# Patient Record
Sex: MALE | Race: WHITE | NOT HISPANIC OR LATINO | Employment: FULL TIME | URBAN - METROPOLITAN AREA
[De-identification: names, ages, dates, MRNs, and addresses within clinical notes are randomized per-mention and may not be internally consistent; named-entity substitution may affect disease eponyms.]

---

## 2023-08-04 LAB
ALANINE AMINOTRANSFERASE (SGPT) (U/L) IN SER/PLAS: 21 U/L (ref 10–52)
ALBUMIN (G/DL) IN SER/PLAS: 4.7 G/DL (ref 3.4–5)
ALKALINE PHOSPHATASE (U/L) IN SER/PLAS: 70 U/L (ref 33–120)
ANION GAP IN SER/PLAS: 11 MMOL/L (ref 10–20)
APPEARANCE, URINE: CLEAR
ASPARTATE AMINOTRANSFERASE (SGOT) (U/L) IN SER/PLAS: 17 U/L (ref 9–39)
BASOPHILS (10*3/UL) IN BLOOD BY AUTOMATED COUNT: 0.07 X10E9/L (ref 0–0.1)
BASOPHILS/100 LEUKOCYTES IN BLOOD BY AUTOMATED COUNT: 1.1 % (ref 0–2)
BILIRUBIN TOTAL (MG/DL) IN SER/PLAS: 0.7 MG/DL (ref 0–1.2)
BILIRUBIN, URINE: NEGATIVE
BLOOD, URINE: NEGATIVE
CALCIUM (MG/DL) IN SER/PLAS: 9.4 MG/DL (ref 8.6–10.6)
CARBON DIOXIDE, TOTAL (MMOL/L) IN SER/PLAS: 29 MMOL/L (ref 21–32)
CHLORIDE (MMOL/L) IN SER/PLAS: 109 MMOL/L (ref 98–107)
CHOLESTEROL (MG/DL) IN SER/PLAS: 134 MG/DL (ref 0–199)
CHOLESTEROL IN HDL (MG/DL) IN SER/PLAS: 50.4 MG/DL
CHOLESTEROL/HDL RATIO: 2.7
COLOR, URINE: NORMAL
CREATINE KINASE (U/L) IN SER/PLAS: 82 U/L (ref 0–325)
CREATININE (MG/DL) IN SER/PLAS: 0.68 MG/DL (ref 0.5–1.3)
EOSINOPHILS (10*3/UL) IN BLOOD BY AUTOMATED COUNT: 0.09 X10E9/L (ref 0–0.7)
EOSINOPHILS/100 LEUKOCYTES IN BLOOD BY AUTOMATED COUNT: 1.4 % (ref 0–6)
ERYTHROCYTE DISTRIBUTION WIDTH (RATIO) BY AUTOMATED COUNT: 12.3 % (ref 11.5–14.5)
ERYTHROCYTE MEAN CORPUSCULAR HEMOGLOBIN CONCENTRATION (G/DL) BY AUTOMATED: 32.9 G/DL (ref 32–36)
ERYTHROCYTE MEAN CORPUSCULAR VOLUME (FL) BY AUTOMATED COUNT: 88 FL (ref 80–100)
ERYTHROCYTES (10*6/UL) IN BLOOD BY AUTOMATED COUNT: 5.26 X10E12/L (ref 4.5–5.9)
GFR MALE: >90 ML/MIN/1.73M2
GLUCOSE (MG/DL) IN SER/PLAS: 96 MG/DL (ref 74–99)
GLUCOSE, URINE: NEGATIVE MG/DL
HEMATOCRIT (%) IN BLOOD BY AUTOMATED COUNT: 46.2 % (ref 41–52)
HEMOGLOBIN (G/DL) IN BLOOD: 15.2 G/DL (ref 13.5–17.5)
IMMATURE GRANULOCYTES/100 LEUKOCYTES IN BLOOD BY AUTOMATED COUNT: 0.6 % (ref 0–0.9)
KETONES, URINE: NEGATIVE MG/DL
LDL: 71 MG/DL (ref 0–99)
LEUKOCYTE ESTERASE, URINE: NEGATIVE
LEUKOCYTES (10*3/UL) IN BLOOD BY AUTOMATED COUNT: 6.2 X10E9/L (ref 4.4–11.3)
LYMPHOCYTES (10*3/UL) IN BLOOD BY AUTOMATED COUNT: 2.37 X10E9/L (ref 1.2–4.8)
LYMPHOCYTES/100 LEUKOCYTES IN BLOOD BY AUTOMATED COUNT: 38.1 % (ref 13–44)
MONOCYTES (10*3/UL) IN BLOOD BY AUTOMATED COUNT: 0.45 X10E9/L (ref 0.1–1)
MONOCYTES/100 LEUKOCYTES IN BLOOD BY AUTOMATED COUNT: 7.2 % (ref 2–10)
NEUTROPHILS (10*3/UL) IN BLOOD BY AUTOMATED COUNT: 3.2 X10E9/L (ref 1.2–7.7)
NEUTROPHILS/100 LEUKOCYTES IN BLOOD BY AUTOMATED COUNT: 51.6 % (ref 40–80)
NITRITE, URINE: NEGATIVE
NRBC (PER 100 WBCS) BY AUTOMATED COUNT: 0 /100 WBC (ref 0–0)
PH, URINE: 7 (ref 5–8)
PLATELETS (10*3/UL) IN BLOOD AUTOMATED COUNT: 312 X10E9/L (ref 150–450)
POTASSIUM (MMOL/L) IN SER/PLAS: 4.9 MMOL/L (ref 3.5–5.3)
PROTEIN TOTAL: 7.3 G/DL (ref 6.4–8.2)
PROTEIN, URINE: NEGATIVE MG/DL
SODIUM (MMOL/L) IN SER/PLAS: 144 MMOL/L (ref 136–145)
SPECIFIC GRAVITY, URINE: 1.01 (ref 1–1.03)
THYROTROPIN (MIU/L) IN SER/PLAS BY DETECTION LIMIT <= 0.05 MIU/L: 1.95 MIU/L (ref 0.44–3.98)
TRIGLYCERIDE (MG/DL) IN SER/PLAS: 61 MG/DL (ref 0–149)
UREA NITROGEN (MG/DL) IN SER/PLAS: 16 MG/DL (ref 6–23)
UROBILINOGEN, URINE: <2 MG/DL (ref 0–1.9)
VLDL: 12 MG/DL (ref 0–40)

## 2023-09-30 PROBLEM — C44.311 BASAL CELL CARCINOMA OF SKIN OF NOSE: Status: ACTIVE | Noted: 2022-06-09

## 2023-09-30 PROBLEM — E78.5 HYPERLIPIDEMIA: Status: ACTIVE | Noted: 2023-09-30

## 2023-09-30 PROBLEM — R50.9 FEVER: Status: ACTIVE | Noted: 2023-09-30

## 2023-09-30 PROBLEM — R63.5 WEIGHT GAIN: Status: ACTIVE | Noted: 2023-09-30

## 2023-09-30 PROBLEM — D22.62 MELANOCYTIC NEVI OF LEFT UPPER LIMB, INCLUDING SHOULDER: Status: ACTIVE | Noted: 2022-06-09

## 2023-09-30 PROBLEM — D22.5 MELANOCYTIC NEVI OF TRUNK: Status: ACTIVE | Noted: 2022-06-09

## 2023-09-30 PROBLEM — M79.10 MYALGIA: Status: ACTIVE | Noted: 2023-09-30

## 2023-09-30 PROBLEM — L91.0 KELOID: Status: ACTIVE | Noted: 2022-06-09

## 2023-09-30 PROBLEM — J30.9 ALLERGIC RHINITIS: Status: ACTIVE | Noted: 2023-09-30

## 2023-09-30 PROBLEM — D23.39 OTHER BENIGN NEOPLASM OF SKIN OF OTHER PARTS OF FACE: Status: ACTIVE | Noted: 2022-06-09

## 2023-09-30 PROBLEM — J04.0 LARYNGITIS: Status: ACTIVE | Noted: 2023-09-30

## 2023-09-30 PROBLEM — G47.30 SLEEP APNEA: Status: ACTIVE | Noted: 2023-09-30

## 2023-09-30 PROBLEM — D23.71 OTHER BENIGN NEOPLASM OF SKIN OF RIGHT LOWER LIMB, INCLUDING HIP: Status: ACTIVE | Noted: 2022-06-09

## 2023-09-30 PROBLEM — R05.9 COUGH: Status: ACTIVE | Noted: 2023-09-30

## 2023-09-30 PROBLEM — E66.3 OVERWEIGHT WITH BODY MASS INDEX (BMI) OF 29 TO 29.9 IN ADULT: Status: ACTIVE | Noted: 2023-09-30

## 2023-09-30 PROBLEM — R09.81 SINUS CONGESTION: Status: ACTIVE | Noted: 2023-09-30

## 2023-09-30 PROBLEM — H53.8 BLURRED VISION: Status: ACTIVE | Noted: 2023-09-30

## 2023-09-30 PROBLEM — L98.9 BENIGN SKIN LESION OF MULTIPLE SITES: Status: ACTIVE | Noted: 2023-09-30

## 2023-09-30 PROBLEM — F32.A DEPRESSION, CONTROLLED: Status: ACTIVE | Noted: 2023-09-30

## 2023-09-30 RX ORDER — MINERAL OIL
1 ENEMA (ML) RECTAL DAILY
COMMUNITY
Start: 2022-04-05

## 2023-09-30 RX ORDER — ALBUTEROL SULFATE 90 UG/1
AEROSOL, METERED RESPIRATORY (INHALATION)
COMMUNITY
Start: 2022-10-24 | End: 2023-10-11 | Stop reason: ALTCHOICE

## 2023-09-30 RX ORDER — CEPHALEXIN 500 MG/1
1 TABLET ORAL 2 TIMES DAILY
COMMUNITY
End: 2023-10-03 | Stop reason: ALTCHOICE

## 2023-09-30 RX ORDER — FLUTICASONE PROPIONATE 50 MCG
2 SPRAY, SUSPENSION (ML) NASAL DAILY
COMMUNITY
Start: 2018-12-17 | End: 2024-04-29 | Stop reason: SDUPTHER

## 2023-09-30 RX ORDER — SULFAMETHOXAZOLE AND TRIMETHOPRIM 800; 160 MG/1; MG/1
1 TABLET ORAL EVERY 12 HOURS
COMMUNITY
Start: 2022-11-17 | End: 2023-10-03 | Stop reason: ALTCHOICE

## 2023-09-30 RX ORDER — ROSUVASTATIN CALCIUM 5 MG/1
1 TABLET, COATED ORAL NIGHTLY
COMMUNITY
Start: 2022-04-14 | End: 2024-02-29 | Stop reason: SDUPTHER

## 2023-10-03 ENCOUNTER — OFFICE VISIT (OUTPATIENT)
Dept: PRIMARY CARE | Facility: CLINIC | Age: 48
End: 2023-10-03
Payer: OTHER GOVERNMENT

## 2023-10-03 VITALS — BODY MASS INDEX: 30.11 KG/M2 | DIASTOLIC BLOOD PRESSURE: 90 MMHG | WEIGHT: 198 LBS | SYSTOLIC BLOOD PRESSURE: 120 MMHG

## 2023-10-03 DIAGNOSIS — M25.441 FINGER JOINT SWELLING, RIGHT: Primary | ICD-10-CM

## 2023-10-03 PROCEDURE — 99213 OFFICE O/P EST LOW 20 MIN: CPT | Performed by: INTERNAL MEDICINE

## 2023-10-03 ASSESSMENT — PATIENT HEALTH QUESTIONNAIRE - PHQ9
2. FEELING DOWN, DEPRESSED OR HOPELESS: NOT AT ALL
SUM OF ALL RESPONSES TO PHQ9 QUESTIONS 1 AND 2: 0
1. LITTLE INTEREST OR PLEASURE IN DOING THINGS: NOT AT ALL

## 2023-10-03 NOTE — PROGRESS NOTES
Subjective   Patient ID: Glen Reynolds is a 48 y.o. male who presents for Hand Injury (Left ring finger cut.  Possible infection).    HPI  Patient  is here for visit   Swelling on the middle knuckle of the left middle  we had treated with antbx for five days  Still swelling and slightly painful   Review of Systems    Previous history  Past Medical History:   Diagnosis Date    Abnormal weight gain 07/29/2022    Weight gain    Acute laryngitis 11/17/2022    Laryngitis    Cough, unspecified 11/17/2022    Cough    Disorder of the skin and subcutaneous tissue, unspecified 02/27/2021    Benign skin lesion of multiple sites    Fever, unspecified 07/29/2022    Fever    Hyperlipidemia, unspecified 12/29/2022    Hyperlipidemia    Myalgia, unspecified site 07/29/2022    Myalgia    Nasal congestion 07/28/2022    Sinus congestion    Other seasonal allergic rhinitis     Seasonal allergies    Personal history of other diseases of the respiratory system 10/31/2019    History of acute bronchitis    Personal history of other mental and behavioral disorders     History of depression    Sneezing 04/05/2022    Sneezing     Past Surgical History:   Procedure Laterality Date    OTHER SURGICAL HISTORY  10/31/2019    No history of surgery     Social History     Tobacco Use    Smoking status: Never    Smokeless tobacco: Never     Family History   Problem Relation Name Age of Onset    Diabetes Mother      Depression Mother          major    COPD Father      Diabetes Father      Other (spinal stenosis) Father       Allergies   Allergen Reactions    Latex Unknown     Current Outpatient Medications   Medication Instructions    albuterol 90 mcg/actuation inhaler inhalation    cephalexin (Keftab) 500 mg tablet 1 tablet, oral, 2 times daily    fexofenadine (Allegra) 180 mg tablet 1 tablet, oral, Daily    fluticasone (Flonase) 50 mcg/actuation nasal spray 2 sprays, Each Nostril, Daily    rosuvastatin (Crestor) 5 mg tablet 1 tablet, oral, Nightly     sulfamethoxazole-trimethoprim (Bactrim DS) 800-160 mg tablet 1 tablet, oral, Every 12 hours       Objective       Physical Exam  Vital Signs: as recorded above  General: Well groomed, well nourished   Orientation:  Alert , oriented to time, place , and person   Mood and Affect:  Cooperative , no apparent distress normal affect  Skin: Good color, good turgor  color erythematous dorsum PIP of 3rd digit , swelling ,mild tenderness  Eyes: Extra ocular muscle movements intact, anicteric sclerae  Neck: Supple, full range of movement  Extremities: full range of movement  bilateral UE and bilateral LE,  no lower extremity edema  Neurological: Alert, oriented, cranial nerves II-XII intact except for visual acuity  Sensation:  Intact   Gait: normal steady      Assessment/Plan   Glen Reynolds is a 48 y.o. male who presents for the concerns below:    Problem List Items Addressed This Visit    None    Swelling s/p treatment of infection ,this has resolved but the swelling has not, had been taking 200mg bid so will increase to 400 mg tid withmeals  for three days  if still not back to  Normal will do an xray          Discussed with:   Return in :    Portions of this note were generated using digital voice recognition software, and may contain grammatical errors       Miya Callaway MD  10/03/23  3:44 PM

## 2023-10-10 ENCOUNTER — OFFICE VISIT (OUTPATIENT)
Dept: PRIMARY CARE | Facility: CLINIC | Age: 48
End: 2023-10-10
Payer: OTHER GOVERNMENT

## 2023-10-10 ENCOUNTER — ANCILLARY PROCEDURE (OUTPATIENT)
Dept: RADIOLOGY | Facility: CLINIC | Age: 48
End: 2023-10-10
Payer: OTHER GOVERNMENT

## 2023-10-10 VITALS — WEIGHT: 200 LBS | BODY MASS INDEX: 30.41 KG/M2 | SYSTOLIC BLOOD PRESSURE: 120 MMHG | DIASTOLIC BLOOD PRESSURE: 78 MMHG

## 2023-10-10 DIAGNOSIS — M25.442 FINGER JOINT SWELLING, LEFT: ICD-10-CM

## 2023-10-10 DIAGNOSIS — M25.442 FINGER JOINT SWELLING, LEFT: Primary | ICD-10-CM

## 2023-10-10 PROCEDURE — 73140 X-RAY EXAM OF FINGER(S): CPT | Mod: LT

## 2023-10-10 PROCEDURE — 99213 OFFICE O/P EST LOW 20 MIN: CPT | Performed by: INTERNAL MEDICINE

## 2023-10-10 PROCEDURE — 73140 X-RAY EXAM OF FINGER(S): CPT | Mod: LEFT SIDE | Performed by: RADIOLOGY

## 2023-10-10 NOTE — PROGRESS NOTES
Subjective   Patient ID: Glen Reynolds is a 48 y.o. male who presents for FUV. and Hand Pain.    HPI  Patient in for a visit    Review of Systems  General: Denies fever, chills, night sweats,  Eyes: Negative for recent visual changes  Ears, Nose, Throat :  Negative for hearing changes, sinus discomfort  Dermatologic: Negative for new skin conditions, rash  Respiratory: Negative for wheezing, shortness of breath, cough  Cardiovascular: Negative for chest pain, palpitations, or leg swelling  Gastrointestinal: Negative for nausea/vomiting, abdominal pain, changes in bowel habits  Genitourinary NEGATIVE FOR URINARY INCONTINENCE urgency , frequency, discomfort   Musculoskeletal: see hpi  Neurological: Negative for headaches, dizziness    Previous history  Past Medical History:   Diagnosis Date    Abnormal weight gain 07/29/2022    Weight gain    Acute laryngitis 11/17/2022    Laryngitis    Cough, unspecified 11/17/2022    Cough    Disorder of the skin and subcutaneous tissue, unspecified 02/27/2021    Benign skin lesion of multiple sites    Fever, unspecified 07/29/2022    Fever    Hyperlipidemia, unspecified 12/29/2022    Hyperlipidemia    Myalgia, unspecified site 07/29/2022    Myalgia    Nasal congestion 07/28/2022    Sinus congestion    Other seasonal allergic rhinitis     Seasonal allergies    Personal history of other diseases of the respiratory system 10/31/2019    History of acute bronchitis    Personal history of other mental and behavioral disorders     History of depression    Sneezing 04/05/2022    Sneezing     Past Surgical History:   Procedure Laterality Date    OTHER SURGICAL HISTORY  10/31/2019    No history of surgery     Social History     Tobacco Use    Smoking status: Never    Smokeless tobacco: Never     Family History   Problem Relation Name Age of Onset    Diabetes Mother      Depression Mother          major    COPD Father      Diabetes Father      Other (spinal stenosis) Father       Allergies    Allergen Reactions    Latex Unknown     Current Outpatient Medications   Medication Instructions    albuterol 90 mcg/actuation inhaler inhalation    fexofenadine (Allegra) 180 mg tablet 1 tablet, oral, Daily    fluticasone (Flonase) 50 mcg/actuation nasal spray 2 sprays, Each Nostril, Daily    rosuvastatin (Crestor) 5 mg tablet 1 tablet, oral, Nightly       Objective       Physical Exam  Vital Signs: as recorded above  General: Well groomed, well nourished   Orientation:  Alert , oriented to time, place , and person   Mood and Affect:  Cooperative , no apparent distress normal affect  Skin: Good color, good turgor  Eyes: Extra ocular muscle movements intact, anicteric sclerae  Neck: Supple, full range of movement  Chest: Normal breath sounds, normal chest wall exam, symmetric, good air entry, clear to auscultation  Heart: Regular rate and rhythm, without murmur, gallop, or rubs  BACK:  no CTLS spine tenderness, no flank tenderness  Extremities: full range of movement  bilateral UE and bilateral LE,  no lower extremity edema  Neurological: Alert, oriented, cranial nerves II-XII intact except for visual acuity  Sensation:  Intact   Gait: normal steady      Assessment/Plan   Glen Reynolds is a 48 y.o. male who presents for the concerns below:    Problem List Items Addressed This Visit    None    Hand JOINT PAIN PLAN:  rest, ice initially then may alternate heat and ice compress, gentle stretching, NSAIDs with meals but if with gastrointestinal upset hold nsaids .  If still not improved will prescribe stronger anti-inflammatories or prescribe a Medrol Dosepak, if no improvement  will do an x-ray and consult with orthopedic or physical therapy    falls precautions , if extremity gives way will need immediate reassessment           Discussed with:   Return in :    Portions of this note were generated using digital voice recognition software, and may contain grammatical errors       Miya Callaway,  MD  10/10/23  2:26 PM

## 2023-10-11 PROBLEM — M79.10 MYALGIA: Status: RESOLVED | Noted: 2023-09-30 | Resolved: 2023-10-11

## 2023-10-11 PROBLEM — R09.81 SINUS CONGESTION: Status: RESOLVED | Noted: 2023-09-30 | Resolved: 2023-10-11

## 2023-10-11 PROBLEM — R50.9 FEVER: Status: RESOLVED | Noted: 2023-09-30 | Resolved: 2023-10-11

## 2023-10-11 PROBLEM — R05.9 COUGH: Status: RESOLVED | Noted: 2023-09-30 | Resolved: 2023-10-11

## 2023-10-11 PROBLEM — J04.0 LARYNGITIS: Status: RESOLVED | Noted: 2023-09-30 | Resolved: 2023-10-11

## 2023-10-13 ENCOUNTER — APPOINTMENT (OUTPATIENT)
Dept: PRIMARY CARE | Facility: CLINIC | Age: 48
End: 2023-10-13
Payer: OTHER GOVERNMENT

## 2023-10-27 ENCOUNTER — PATIENT MESSAGE (OUTPATIENT)
Dept: PRIMARY CARE | Facility: CLINIC | Age: 48
End: 2023-10-27
Payer: OTHER GOVERNMENT

## 2023-10-27 DIAGNOSIS — L98.9 FINGER LESION: Primary | ICD-10-CM

## 2023-10-30 ENCOUNTER — APPOINTMENT (OUTPATIENT)
Dept: ORTHOPEDIC SURGERY | Facility: HOSPITAL | Age: 48
End: 2023-10-30
Payer: OTHER GOVERNMENT

## 2023-10-31 ENCOUNTER — OFFICE VISIT (OUTPATIENT)
Dept: ORTHOPEDIC SURGERY | Facility: CLINIC | Age: 48
End: 2023-10-31
Payer: OTHER GOVERNMENT

## 2023-10-31 DIAGNOSIS — L98.9 FINGER LESION: ICD-10-CM

## 2023-10-31 PROCEDURE — 1036F TOBACCO NON-USER: CPT | Performed by: ORTHOPAEDIC SURGERY

## 2023-10-31 PROCEDURE — 99203 OFFICE O/P NEW LOW 30 MIN: CPT | Performed by: ORTHOPAEDIC SURGERY

## 2023-10-31 ASSESSMENT — PAIN - FUNCTIONAL ASSESSMENT: PAIN_FUNCTIONAL_ASSESSMENT: NO/DENIES PAIN

## 2023-11-01 ENCOUNTER — APPOINTMENT (OUTPATIENT)
Dept: ORTHOPEDIC SURGERY | Facility: HOSPITAL | Age: 48
End: 2023-11-01
Payer: OTHER GOVERNMENT

## 2023-11-01 NOTE — PROGRESS NOTES
History of Present Illness:  Chief Complaint   Patient presents with    Left Hand - Follow-up     Left middle finger laceration- 6 weeks laceration caused by a porcelain cookie jar that was broken       Patient presents today for evaluation of left middle finger laceration that occurred 6 weeks ago.  Referred by Dr. Callaway.  He was pulling on a plug behind a porcelain cookie jar with a broken handle.  He hit the dorsal aspect of his middle finger on the broken handle which caused a laceration about the dorsal aspect of the PIP joint region.  He was initially seen in urgent care where wound was irrigated and reapproximated.  He did have a 1 week course of prophylactic antibiotics.  He has been able to fully move his finger, but notes some soreness with full motion, particularly flexion.  No numbness or tingling.  Also with continued soreness to direct touch.    Past Medical History:   Diagnosis Date    Abnormal weight gain 07/29/2022    Weight gain    Acute laryngitis 11/17/2022    Laryngitis    Cough, unspecified 11/17/2022    Cough    Disorder of the skin and subcutaneous tissue, unspecified 02/27/2021    Benign skin lesion of multiple sites    Fever, unspecified 07/29/2022    Fever    Hyperlipidemia, unspecified 12/29/2022    Hyperlipidemia    Myalgia, unspecified site 07/29/2022    Myalgia    Nasal congestion 07/28/2022    Sinus congestion    Other seasonal allergic rhinitis     Seasonal allergies    Personal history of other diseases of the respiratory system 10/31/2019    History of acute bronchitis    Personal history of other mental and behavioral disorders     History of depression    Sneezing 04/05/2022    Sneezing       Medication Documentation Review Audit       Reviewed by Bebeto Greenberg MD (Physician) on 11/01/23 at 1817      Medication Order Taking? Sig Documenting Provider Last Dose Status   fexofenadine (Allegra) 180 mg tablet [id number]  Take 1 tablet (180 mg) by mouth once daily.  Historical Provider, MD  Active   fluticasone (Flonase) 50 mcg/actuation nasal spray [id number]  Administer 2 sprays into each nostril once daily. Historical Provider, MD  Active   rosuvastatin (Crestor) 5 mg tablet [id number]  Take 1 tablet (5 mg) by mouth once daily at bedtime. Historical Provider, MD  Active                    Allergies   Allergen Reactions    Latex Unknown       Social History     Socioeconomic History    Marital status:      Spouse name: Not on file    Number of children: Not on file    Years of education: Not on file    Highest education level: Not on file   Occupational History    Not on file   Tobacco Use    Smoking status: Never    Smokeless tobacco: Never   Substance and Sexual Activity    Alcohol use: Not on file    Drug use: Not on file    Sexual activity: Not on file   Other Topics Concern    Not on file   Social History Narrative    Not on file     Social Determinants of Health     Financial Resource Strain: Not on file   Food Insecurity: Not on file   Transportation Needs: Not on file   Physical Activity: Not on file   Stress: Not on file   Social Connections: Not on file   Intimate Partner Violence: Not on file   Housing Stability: Not on file       Past Surgical History:   Procedure Laterality Date    OTHER SURGICAL HISTORY  10/31/2019    No history of surgery        Review of Systems   GENERAL: Negative for malaise, significant weight loss, fever  MUSCULOSKELETAL: see HPI  NEURO:  Negative     Physical Examination  Constitutional: Appears well-developed and well-nourished.  Head: Normocephalic and atraumatic.  Eyes: EOMI grossly  Cardiovascular: Intact distal pulses.   Respiratory: Effort normal. No respiratory distress.  Neurologic: Alert and oriented to person, place, and time.  Skin: Skin is warm and dry.  Hematologic / Lymphatic: No lymphedema, lymphangitis.  Psychiatric: normal mood and affect. Behavior is normal.   Musculoskeletal:  Left middle finger: Healed laceration  about dorsal aspect of PIP joint that is oblique in nature.  5/5 finger extension and FDS/FDP function.  Negative Alex's test.  No swan-neck/boutonniere deformity.  Mild scar tissue thickening typical of peak reaction.  Capillary refill less than 2 seconds distally.  No gross signs of infection.      Assessment:  Patient with left middle finger dorsal laceration that appears to be well-healing with some scar tissue thickening.  No gross tendon/nerve involvement on examination today.     Plan:  Nature of the diagnosis was discussed with the patient.  We discussed peak reaction as well as recommendation for scar tissue massage and continued progression of activities.  If he did lose any mobility or had any other development of issues in the future would recommend further follow-up.  Formal therapy referral deferred at this time given excellent range of motion.  Questions addressed.  Follow-up in the future as needed.

## 2023-11-03 ENCOUNTER — APPOINTMENT (OUTPATIENT)
Dept: ORTHOPEDIC SURGERY | Facility: HOSPITAL | Age: 48
End: 2023-11-03
Payer: OTHER GOVERNMENT

## 2023-12-08 ENCOUNTER — TELEPHONE (OUTPATIENT)
Dept: PRIMARY CARE | Facility: CLINIC | Age: 48
End: 2023-12-08
Payer: OTHER GOVERNMENT

## 2023-12-08 NOTE — TELEPHONE ENCOUNTER
Called Pt and relayed info.  LG        ----- Message from Miya Callaway MD sent at 12/8/2023  9:20 AM EST -----  Regarding: FW: Do I need a referral for optometrist?  Contact: [phone]  Pls let him know I can place the consult for ophthalmology , I do not think he needs a referral for optometrist but his insurance would have guidelines for that .  [facility] has some of our eye specialists including optometrist .   Susi harris  ----- Message -----  From: Favian Dunlap MA  Sent: 12/8/2023   9:12 AM EST  To: Miya Callaway MD  Subject: FW: Do I need a referral for optometrist?          ----- Message -----  From: Glen Reynolds  Sent: 12/8/2023   8:05 AM EST  To: [facility] [place], [place] [address] Clinical Support Staff  Subject: Do I need a referral for optometrist?            Dear Dr. eSgura,    I hope this message finds you well.  I am having some blurry vision issues and would like to see someone to establish a vision baseline in the  system.  Do I need a referral?    Thanks!   John MCCARTHY

## 2024-02-19 ENCOUNTER — LAB (OUTPATIENT)
Dept: LAB | Facility: LAB | Age: 49
End: 2024-02-19
Payer: OTHER GOVERNMENT

## 2024-02-19 ENCOUNTER — HOSPITAL ENCOUNTER (OUTPATIENT)
Dept: RADIOLOGY | Facility: CLINIC | Age: 49
Discharge: HOME | End: 2024-02-19
Payer: OTHER GOVERNMENT

## 2024-02-19 ENCOUNTER — OFFICE VISIT (OUTPATIENT)
Dept: PRIMARY CARE | Facility: CLINIC | Age: 49
End: 2024-02-19
Payer: OTHER GOVERNMENT

## 2024-02-19 VITALS — SYSTOLIC BLOOD PRESSURE: 125 MMHG | BODY MASS INDEX: 30.71 KG/M2 | DIASTOLIC BLOOD PRESSURE: 84 MMHG | WEIGHT: 202 LBS

## 2024-02-19 DIAGNOSIS — E55.9 VITAMIN D DEFICIENCY: ICD-10-CM

## 2024-02-19 DIAGNOSIS — M79.671 FOOT PAIN, RIGHT: ICD-10-CM

## 2024-02-19 DIAGNOSIS — M79.671 FOOT PAIN, RIGHT: Primary | ICD-10-CM

## 2024-02-19 DIAGNOSIS — E78.5 HYPERLIPIDEMIA, UNSPECIFIED HYPERLIPIDEMIA TYPE: ICD-10-CM

## 2024-02-19 DIAGNOSIS — R53.83 FATIGUE, UNSPECIFIED TYPE: ICD-10-CM

## 2024-02-19 LAB
25(OH)D3 SERPL-MCNC: 26 NG/ML (ref 30–100)
ALBUMIN SERPL BCP-MCNC: 4.7 G/DL (ref 3.4–5)
ALP SERPL-CCNC: 61 U/L (ref 33–120)
ALT SERPL W P-5'-P-CCNC: 26 U/L (ref 10–52)
ANION GAP SERPL CALC-SCNC: 15 MMOL/L (ref 10–20)
AST SERPL W P-5'-P-CCNC: 19 U/L (ref 9–39)
BILIRUB SERPL-MCNC: 0.7 MG/DL (ref 0–1.2)
BUN SERPL-MCNC: 16 MG/DL (ref 6–23)
CALCIUM SERPL-MCNC: 9.2 MG/DL (ref 8.6–10.6)
CHLORIDE SERPL-SCNC: 108 MMOL/L (ref 98–107)
CO2 SERPL-SCNC: 24 MMOL/L (ref 21–32)
CREAT SERPL-MCNC: 0.77 MG/DL (ref 0.5–1.3)
EGFRCR SERPLBLD CKD-EPI 2021: >90 ML/MIN/1.73M*2
ERYTHROCYTE [DISTWIDTH] IN BLOOD BY AUTOMATED COUNT: 12.1 % (ref 11.5–14.5)
GLUCOSE SERPL-MCNC: 79 MG/DL (ref 74–99)
HCT VFR BLD AUTO: 45 % (ref 41–52)
HGB BLD-MCNC: 14.7 G/DL (ref 13.5–17.5)
MCH RBC QN AUTO: 28.9 PG (ref 26–34)
MCHC RBC AUTO-ENTMCNC: 32.7 G/DL (ref 32–36)
MCV RBC AUTO: 89 FL (ref 80–100)
NRBC BLD-RTO: 0 /100 WBCS (ref 0–0)
PLATELET # BLD AUTO: 314 X10*3/UL (ref 150–450)
POTASSIUM SERPL-SCNC: 4.2 MMOL/L (ref 3.5–5.3)
PROT SERPL-MCNC: 6.7 G/DL (ref 6.4–8.2)
RBC # BLD AUTO: 5.08 X10*6/UL (ref 4.5–5.9)
SODIUM SERPL-SCNC: 143 MMOL/L (ref 136–145)
WBC # BLD AUTO: 7.2 X10*3/UL (ref 4.4–11.3)

## 2024-02-19 PROCEDURE — 99214 OFFICE O/P EST MOD 30 MIN: CPT | Performed by: INTERNAL MEDICINE

## 2024-02-19 PROCEDURE — 1036F TOBACCO NON-USER: CPT | Performed by: INTERNAL MEDICINE

## 2024-02-19 PROCEDURE — 73630 X-RAY EXAM OF FOOT: CPT | Mod: RIGHT SIDE | Performed by: RADIOLOGY

## 2024-02-19 PROCEDURE — 85027 COMPLETE CBC AUTOMATED: CPT

## 2024-02-19 PROCEDURE — 80053 COMPREHEN METABOLIC PANEL: CPT

## 2024-02-19 PROCEDURE — 73630 X-RAY EXAM OF FOOT: CPT | Mod: RT

## 2024-02-19 PROCEDURE — 36415 COLL VENOUS BLD VENIPUNCTURE: CPT

## 2024-02-19 PROCEDURE — 82306 VITAMIN D 25 HYDROXY: CPT

## 2024-02-19 ASSESSMENT — ENCOUNTER SYMPTOMS
OCCASIONAL FEELINGS OF UNSTEADINESS: 0
LOSS OF SENSATION IN FEET: 0
DEPRESSION: 0

## 2024-02-19 ASSESSMENT — COLUMBIA-SUICIDE SEVERITY RATING SCALE - C-SSRS
2. HAVE YOU ACTUALLY HAD ANY THOUGHTS OF KILLING YOURSELF?: NO
6. HAVE YOU EVER DONE ANYTHING, STARTED TO DO ANYTHING, OR PREPARED TO DO ANYTHING TO END YOUR LIFE?: NO
1. IN THE PAST MONTH, HAVE YOU WISHED YOU WERE DEAD OR WISHED YOU COULD GO TO SLEEP AND NOT WAKE UP?: NO

## 2024-02-19 ASSESSMENT — PATIENT HEALTH QUESTIONNAIRE - PHQ9
2. FEELING DOWN, DEPRESSED OR HOPELESS: NOT AT ALL
1. LITTLE INTEREST OR PLEASURE IN DOING THINGS: NOT AT ALL
SUM OF ALL RESPONSES TO PHQ9 QUESTIONS 1 AND 2: 0

## 2024-02-19 NOTE — PROGRESS NOTES
Subjective   Patient ID: Glen Reynolds is a 48 y.o. male who presents for FUV.    HPI  Patient in for a visit  A month ago right foot pain wears shoes that do not have support not sure if   Swelling feels a lump  Started a stricter low fat diet when his wife started ,did gain weight    , struggling to take 8 hours of sleep , has a tight schedule for work gets his children off to school ,tries to do stair time at work he does have a standing table but not since his foot started pain   Seeing counselor every friday  Review of Systems  General: Denies fever, chills, night sweats,  Eyes: Negative for recent visual changes  Ears, Nose, Throat :  Negative for hearing changes, sinus discomfort  Dermatologic: Negative for new skin conditions, rash  Respiratory: Negative for wheezing, shortness of breath, cough  Cardiovascular: Negative for chest pain, palpitations, or leg swelling  Gastrointestinal: Negative for nausea/vomiting, abdominal pain, changes in bowel habits  Genitourinary NEGATIVE FOR URINARY INCONTINENCE urgency , frequency, discomfort   Musculoskeletal: see hpi  Neurological: Negative for headaches, dizziness    Previous history  Past Medical History:   Diagnosis Date    Abnormal weight gain 07/29/2022    Weight gain    Acute laryngitis 11/17/2022    Laryngitis    Cough, unspecified 11/17/2022    Cough    Disorder of the skin and subcutaneous tissue, unspecified 02/27/2021    Benign skin lesion of multiple sites    Fever, unspecified 07/29/2022    Fever    Hyperlipidemia, unspecified 12/29/2022    Hyperlipidemia    Myalgia, unspecified site 07/29/2022    Myalgia    Nasal congestion 07/28/2022    Sinus congestion    Other seasonal allergic rhinitis     Seasonal allergies    Personal history of other diseases of the respiratory system 10/31/2019    History of acute bronchitis    Personal history of other mental and behavioral disorders     History of depression    Sneezing 04/05/2022    Sneezing     Past Surgical  History:   Procedure Laterality Date    OTHER SURGICAL HISTORY  10/31/2019    No history of surgery     Social History     Tobacco Use    Smoking status: Never    Smokeless tobacco: Never     Family History   Problem Relation Name Age of Onset    Diabetes Mother      Depression Mother          major    COPD Father      Diabetes Father      Other (spinal stenosis) Father       Allergies   Allergen Reactions    Latex Unknown     Current Outpatient Medications   Medication Instructions    fexofenadine (Allegra) 180 mg tablet 1 tablet, oral, Daily    fluticasone (Flonase) 50 mcg/actuation nasal spray 2 sprays, Each Nostril, Daily    rosuvastatin (Crestor) 5 mg tablet 1 tablet, oral, Nightly       Objective       Physical Exam  Vital Signs: as recorded above  General: Well groomed, well nourished   Orientation:  Alert , oriented to time, place , and person   Mood and Affect:  Cooperative , no apparent distress normal affect  Skin: Good color, good turgor  Eyes: Extra ocular muscle movements intact, anicteric sclerae  Neck: Supple, full range of movement  Chest: Normal breath sounds, normal chest wall exam, symmetric, good air entry, clear to auscultation  Heart: Regular rate and rhythm, without murmur, gallop, or rubs  BACK:  no CTLS spine tenderness, no flank tenderness  Extremities: full range of movement  bilateral UE and bilateral LE,  no lower extremity edema  Neurological: Alert, oriented, cranial nerves II-XII grossly intact except for visual acuity  Sensation:  Intact   Gait: normal steady      Assessment/Plan   Glen Reynolds is a 48 y.o. male who presents for the concerns below:    Problem List Items Addressed This Visit    None  FOOT PAIN wear good support shoes, insert and do an xray if with persistent pain will consider podiatry consult     OBSTRUCTIVE SLEEP APNEA -having a hard time with his cpap  control of sleep apnea with use of cpap machine during sleep , not sure if he has gained weight    FATIGUE PLAN:   rest, hydration, sleep 6-7 hours at least of effective sleep, will check endocrine, metabolic causes of fatigue, infection screen -    HYPERCHOLESTEROLEMIA PLAN: stable  continue current medications  Follow low cholesterol diet, encouraged high omega 3 fatty acid intake in diet, exercise, weight control. . Will Obtain AST/ALT,    WEIGHT GAIN tsh last time was normal, sleep hygiene     Discussed with:   Return in :  6 month sooner     Portions of this note were generated using digital voice recognition software, and may contain grammatical errors       Miya Callaway MD  02/19/24  2:14 PM

## 2024-02-19 NOTE — PATIENT INSTRUCTIONS
Try to do calisthenics , light weights to build upper body strength until we get the foot pain better     Try to take b complex vitamin

## 2024-02-20 RX ORDER — CHOLECALCIFEROL (VITAMIN D3) 1250 MCG
50000 TABLET ORAL
Qty: 12 TABLET | Refills: 0 | Status: SHIPPED | OUTPATIENT
Start: 2024-02-20 | End: 2024-05-20

## 2024-02-29 DIAGNOSIS — E78.5 HYPERLIPIDEMIA, UNSPECIFIED HYPERLIPIDEMIA TYPE: ICD-10-CM

## 2024-02-29 RX ORDER — ROSUVASTATIN CALCIUM 5 MG/1
5 TABLET, COATED ORAL NIGHTLY
Qty: 90 TABLET | Refills: 1 | Status: SHIPPED | OUTPATIENT
Start: 2024-02-29

## 2024-04-02 ENCOUNTER — PATIENT MESSAGE (OUTPATIENT)
Dept: PRIMARY CARE | Facility: CLINIC | Age: 49
End: 2024-04-02
Payer: OTHER GOVERNMENT

## 2024-04-02 DIAGNOSIS — M79.673 PAIN OF FOOT, UNSPECIFIED LATERALITY: Primary | ICD-10-CM

## 2024-04-18 ENCOUNTER — OFFICE VISIT (OUTPATIENT)
Dept: PODIATRY | Facility: CLINIC | Age: 49
End: 2024-04-18
Payer: OTHER GOVERNMENT

## 2024-04-18 ENCOUNTER — TELEPHONE (OUTPATIENT)
Dept: PRIMARY CARE | Facility: CLINIC | Age: 49
End: 2024-04-18

## 2024-04-18 VITALS — HEIGHT: 68 IN | BODY MASS INDEX: 30.62 KG/M2 | WEIGHT: 202 LBS

## 2024-04-18 DIAGNOSIS — M21.42 PES PLANUS OF BOTH FEET: ICD-10-CM

## 2024-04-18 DIAGNOSIS — M76.71 PERONEAL TENDONITIS, RIGHT: Primary | ICD-10-CM

## 2024-04-18 DIAGNOSIS — M21.41 PES PLANUS OF BOTH FEET: ICD-10-CM

## 2024-04-18 PROCEDURE — 99202 OFFICE O/P NEW SF 15 MIN: CPT | Performed by: PODIATRIST

## 2024-04-18 PROCEDURE — 1036F TOBACCO NON-USER: CPT | Performed by: PODIATRIST

## 2024-04-18 RX ORDER — VALACYCLOVIR HYDROCHLORIDE 1 G/1
TABLET, FILM COATED ORAL
COMMUNITY
Start: 2020-11-09

## 2024-04-18 NOTE — PROGRESS NOTES
CC: right foot pain    HPI:  Patient seen with pain outer right foot, no acute injury, pain with walking, possible flat feet as well.    PCP: Dr. Nunez  Last visit: 2-19-24     PMH  Past Medical History:   Diagnosis Date    Abnormal weight gain 07/29/2022    Weight gain    Acute laryngitis 11/17/2022    Laryngitis    Cough, unspecified 11/17/2022    Cough    Disorder of the skin and subcutaneous tissue, unspecified 02/27/2021    Benign skin lesion of multiple sites    Fever, unspecified 07/29/2022    Fever    Hyperlipidemia, unspecified 12/29/2022    Hyperlipidemia    Myalgia, unspecified site 07/29/2022    Myalgia    Nasal congestion 07/28/2022    Sinus congestion    Other seasonal allergic rhinitis     Seasonal allergies    Personal history of other diseases of the respiratory system 10/31/2019    History of acute bronchitis    Personal history of other mental and behavioral disorders     History of depression    Sneezing 04/05/2022    Sneezing     MEDS    Current Outpatient Medications:     valACYclovir (Valtrex) 1 gram tablet, Take by mouth., Disp: , Rfl:     cholecalciferol (Vitamin D3) 1,250 mcg (50,000 unit) tablet, Take 1 tablet (50,000 Units) by mouth every 7 days., Disp: 12 tablet, Rfl: 0    fexofenadine (Allegra) 180 mg tablet, Take 1 tablet (180 mg) by mouth once daily., Disp: , Rfl:     fluticasone (Flonase) 50 mcg/actuation nasal spray, Administer 2 sprays into each nostril once daily., Disp: , Rfl:     rosuvastatin (Crestor) 5 mg tablet, Take 1 tablet (5 mg) by mouth once daily at bedtime., Disp: 90 tablet, Rfl: 1  Allergies  Allergies   Allergen Reactions    Latex Unknown     Social History     Socioeconomic History    Marital status:      Spouse name: None    Number of children: None    Years of education: None    Highest education level: None   Occupational History    None   Tobacco Use    Smoking status: Never    Smokeless tobacco: Never   Substance and Sexual Activity    Alcohol use:  "None    Drug use: None    Sexual activity: None   Other Topics Concern    None   Social History Narrative    None     Social Determinants of Health     Financial Resource Strain: Not on file   Food Insecurity: Not on file   Transportation Needs: Not on file   Physical Activity: Not on file   Stress: Not on file   Social Connections: Not on file   Intimate Partner Violence: Not on file   Housing Stability: Not on file     Family History   Problem Relation Name Age of Onset    Diabetes Mother      Depression Mother          major    COPD Father      Diabetes Father      Other (spinal stenosis) Father       Past Surgical History:   Procedure Laterality Date    OTHER SURGICAL HISTORY  10/31/2019    No history of surgery       REVIEW OF SYSTEMS    + as noted above in HPI.       Physical examination:   On General Observation: Patient is a pleasant, cooperative, well developed 48 y.o.  adult male. The patient is alert and oriented to time, place and person. Patient has normal affect and mood.  Ht 1.727 m (5' 8\")   Wt 91.6 kg (202 lb)   BMI 30.71 kg/m²     Vascular:  DP and PT pulses are 2/4 b/l.  no edema noted. no varicosities b/l.  CFT  5 seconds to all digits bilateral.  Skin temperature is warm to warm from proximal to distal bilateral.      Muscular: Strength is 5/5 b/l.  Motor strength is normal and symmetric proximally, as well as distally, in all four extremities. Good mobility of all extremities.  Tenderness to right base 5th metatarsal.     Neuro:  Proprioception present.   Sensation to vibration is  present. Protective sensation intact  at all pedal sites via Childs New 5.07 monofilament bilateral.  Light touch present bilateral.     Derm:  No rashes, lesions, or ecchymosis.     Ortho:  Loss of the medial plantar arch is present, slight pain to the base of the 5th metatarsal insertion of the peroneus brevis tendon, rom is stable 1st mpj, mtj, stj, aj    Xrays:  Narrative & Impression   Interpreted By:  " Elliott Baker,   STUDY:  XR FOOT RIGHT 3+ VIEWS;  2/19/2024 3:09 pm      INDICATION:  Signs/Symptoms:persistent foot pain.      COMPARISON:  None.      ACCESSION NUMBER(S):  [id number]      ORDERING CLINICIAN:  JUAN CALIX      FINDINGS:  No acute fracture is identified. No dislocation is seen. There are no  lytic or blastic lesions. No radiopaque foreign bodies are noted.  There is mild degenerative spurring.      IMPRESSION:  No radiographic evidence of an acute fracture.           ASSESSMENT:    Peroneal tendonitis insertional  Pes planus b/l       PLAN:   Consult  Le exam, reviewed xrays with pt.  Reviewed etiology of the above and treatment options.  A comprehensive history and physical examination were preformed. The patient was educated on clinical findings, diagnosis and treatment plans. Patient understands all that has been explained and all questions were answered to apparent satisfaction.   -will rx for formal PT  -recommend price and custom orthotics to address the pes planus      Wesley Garcia DPM

## 2024-04-18 NOTE — TELEPHONE ENCOUNTER
CPT  X2  DX M21.41 AND M21.42 ARE NOT A COVERED BENEFIT W INSURANCE PLAN.   ONLY COVERS IF PATIENT IS ACTIVE DUTY AND DIABETIC.    LMOM FOR PATIENT W THE ABOVE INFO. ASKED HIM TO GIVE THE OFFICE A CALL, IF HE WISHES TO BE SELF PAY, $600.00. THANK YOU!

## 2024-04-18 NOTE — TELEPHONE ENCOUNTER
----- Message from Wesley Garcia DPM sent at 4/18/2024 12:59 PM EDT -----  Regarding: orthotics  Please check orthotic benefits pes carol thanks

## 2024-04-29 ENCOUNTER — PATIENT MESSAGE (OUTPATIENT)
Dept: PRIMARY CARE | Facility: CLINIC | Age: 49
End: 2024-04-29
Payer: OTHER GOVERNMENT

## 2024-04-29 DIAGNOSIS — J30.9 ALLERGIC RHINITIS, UNSPECIFIED SEASONALITY, UNSPECIFIED TRIGGER: ICD-10-CM

## 2024-04-29 DIAGNOSIS — J30.9 ALLERGIC RHINITIS, UNSPECIFIED SEASONALITY, UNSPECIFIED TRIGGER: Primary | ICD-10-CM

## 2024-04-29 RX ORDER — FLUTICASONE PROPIONATE 50 MCG
2 SPRAY, SUSPENSION (ML) NASAL DAILY
Qty: 16 G | Refills: 3 | Status: SHIPPED | OUTPATIENT
Start: 2024-04-29 | End: 2024-04-29 | Stop reason: SDUPTHER

## 2024-04-29 RX ORDER — FLUTICASONE PROPIONATE 50 MCG
2 SPRAY, SUSPENSION (ML) NASAL DAILY
Qty: 16 G | Refills: 3 | Status: SHIPPED | OUTPATIENT
Start: 2024-04-29

## 2024-05-01 ENCOUNTER — EVALUATION (OUTPATIENT)
Dept: PHYSICAL THERAPY | Facility: CLINIC | Age: 49
End: 2024-05-01
Payer: OTHER GOVERNMENT

## 2024-05-01 DIAGNOSIS — M76.71 PERONEAL TENDINITIS OF RIGHT LOWER EXTREMITY: Primary | ICD-10-CM

## 2024-05-01 PROCEDURE — 97162 PT EVAL MOD COMPLEX 30 MIN: CPT | Performed by: PHYSICAL THERAPIST

## 2024-05-01 PROCEDURE — 97035 APP MDLTY 1+ULTRASOUND EA 15: CPT | Performed by: PHYSICAL THERAPIST

## 2024-05-01 NOTE — PROGRESS NOTES
Physical Therapy     Physical Therapy Evaluation and Treatment      Patient Name:   Glen Reynolds   1975   Encounter Diagnosis   Name Primary?    Peroneal tendinitis of right lower extremity Yes        THERAPY VISIT NUMBER:   1    Today's Date:   5-1-24    REFERRING DR. BURNS     SUBJECTIVE:        PAINFUL RIGHT LATERAL FOOT     GOALS:   DECREASE FOOT PAIN     OBJECTIVE:   LIMPING --60 PERCENT WEIGHT BEARING---WITH 6/10 PAIN     ASSESSMENT: SEE FLOW SHEET--DEBILITY WITH WALKING    PLAN AND TREATMENT:  SEE FLOW SHEET PROGRAM IN CHART:    1 X WEEKLY

## 2024-05-15 ENCOUNTER — APPOINTMENT (OUTPATIENT)
Dept: PODIATRY | Facility: CLINIC | Age: 49
End: 2024-05-15
Payer: OTHER GOVERNMENT

## 2024-05-15 ENCOUNTER — OFFICE VISIT (OUTPATIENT)
Dept: PODIATRY | Facility: CLINIC | Age: 49
End: 2024-05-15
Payer: OTHER GOVERNMENT

## 2024-05-15 DIAGNOSIS — M21.42 PES PLANUS OF BOTH FEET: ICD-10-CM

## 2024-05-15 DIAGNOSIS — M76.71 PERONEAL TENDONITIS, RIGHT: Primary | ICD-10-CM

## 2024-05-15 DIAGNOSIS — M21.41 PES PLANUS OF BOTH FEET: ICD-10-CM

## 2024-05-15 PROCEDURE — 1036F TOBACCO NON-USER: CPT | Performed by: PODIATRIST

## 2024-05-15 PROCEDURE — L3020 FOOT LONGITUD/METATARSAL SUP: HCPCS | Performed by: PODIATRIST

## 2024-05-15 NOTE — PROGRESS NOTES
CC: right foot pain     HPI:  Patient seen with pain outer right foot, no acute injury, pain with walking, possible flat feet as well. Doing the therapy times 1, here for orthotics.     PCP: Dr. Nunez  Last visit: 2-19-24      PMH  Medical History        Past Medical History:   Diagnosis Date    Abnormal weight gain 07/29/2022     Weight gain    Acute laryngitis 11/17/2022     Laryngitis    Cough, unspecified 11/17/2022     Cough    Disorder of the skin and subcutaneous tissue, unspecified 02/27/2021     Benign skin lesion of multiple sites    Fever, unspecified 07/29/2022     Fever    Hyperlipidemia, unspecified 12/29/2022     Hyperlipidemia    Myalgia, unspecified site 07/29/2022     Myalgia    Nasal congestion 07/28/2022     Sinus congestion    Other seasonal allergic rhinitis       Seasonal allergies    Personal history of other diseases of the respiratory system 10/31/2019     History of acute bronchitis    Personal history of other mental and behavioral disorders       History of depression    Sneezing 04/05/2022     Sneezing         MEDS     Current Outpatient Medications:     valACYclovir (Valtrex) 1 gram tablet, Take by mouth., Disp: , Rfl:     cholecalciferol (Vitamin D3) 1,250 mcg (50,000 unit) tablet, Take 1 tablet (50,000 Units) by mouth every 7 days., Disp: 12 tablet, Rfl: 0    fexofenadine (Allegra) 180 mg tablet, Take 1 tablet (180 mg) by mouth once daily., Disp: , Rfl:     fluticasone (Flonase) 50 mcg/actuation nasal spray, Administer 2 sprays into each nostril once daily., Disp: , Rfl:     rosuvastatin (Crestor) 5 mg tablet, Take 1 tablet (5 mg) by mouth once daily at bedtime., Disp: 90 tablet, Rfl: 1  Allergies       Allergies   Allergen Reactions    Latex Unknown      Social History   Social History            Socioeconomic History    Marital status:        Spouse name: None    Number of children: None    Years of education: None    Highest education level: None   Occupational History     "None   Tobacco Use    Smoking status: Never    Smokeless tobacco: Never   Substance and Sexual Activity    Alcohol use: None    Drug use: None    Sexual activity: None   Other Topics Concern    None   Social History Narrative    None      Social Determinants of Health      Financial Resource Strain: Not on file   Food Insecurity: Not on file   Transportation Needs: Not on file   Physical Activity: Not on file   Stress: Not on file   Social Connections: Not on file   Intimate Partner Violence: Not on file   Housing Stability: Not on file         Family History          Family History   Problem Relation Name Age of Onset    Diabetes Mother        Depression Mother             major    COPD Father        Diabetes Father        Other (spinal stenosis) Father             Surgical History         Past Surgical History:   Procedure Laterality Date    OTHER SURGICAL HISTORY   10/31/2019     No history of surgery            REVIEW OF SYSTEMS     + as noted above in HPI.         Physical examination:   On General Observation: Patient is a pleasant, cooperative, well developed 48 y.o.  adult male. The patient is alert and oriented to time, place and person. Patient has normal affect and mood.  Ht 1.727 m (5' 8\")   Wt 91.6 kg (202 lb)   BMI 30.71 kg/m²      Vascular:  DP and PT pulses are 2/4 b/l.  no edema noted. no varicosities b/l.  CFT  5 seconds to all digits bilateral.  Skin temperature is warm to warm from proximal to distal bilateral.       Muscular: Strength is 5/5 b/l.  Motor strength is normal and symmetric proximally, as well as distally, in all four extremities. Good mobility of all extremities.  Tenderness to right base 5th metatarsal.      Neuro:  Proprioception present.   Sensation to vibration is  present. Protective sensation intact  at all pedal sites via [place] 5.07 monofilament bilateral.  Light touch present bilateral.      Derm:  No rashes, lesions, or ecchymosis.      Ortho:  Loss of the " medial plantar arch is present, slight pain to the base of the 5th metatarsal insertion of the peroneus brevis tendon, rom is stable 1st mpj, mtj, stj, aj     Xrays:  Narrative & Impression   Interpreted By:  Elliott Baker,   STUDY:  XR FOOT RIGHT 3+ VIEWS;  2/19/2024 3:09 pm      INDICATION:  Signs/Symptoms:persistent foot pain.      COMPARISON:  None.      ACCESSION NUMBER(S):  [id number]      ORDERING CLINICIAN:  JUAN CALIX      FINDINGS:  No acute fracture is identified. No dislocation is seen. There are no  lytic or blastic lesions. No radiopaque foreign bodies are noted.  There is mild degenerative spurring.      IMPRESSION:  No radiographic evidence of an acute fracture.             ASSESSMENT:    Peroneal tendonitis insertional  Pes planus b/l        PLAN:   Exam  Reviewed with pt and options, will continue the therapy, casted for orthotics times 2 pairs sport and graphite, abn signed call when arrives.        Wesley Garcia DPM

## 2024-05-16 ENCOUNTER — TREATMENT (OUTPATIENT)
Dept: PHYSICAL THERAPY | Facility: CLINIC | Age: 49
End: 2024-05-16
Payer: OTHER GOVERNMENT

## 2024-05-16 DIAGNOSIS — M76.71 PERONEAL TENDINITIS OF RIGHT LOWER EXTREMITY: Primary | ICD-10-CM

## 2024-05-16 PROCEDURE — 97140 MANUAL THERAPY 1/> REGIONS: CPT | Performed by: PHYSICAL THERAPIST

## 2024-05-16 PROCEDURE — 97035 APP MDLTY 1+ULTRASOUND EA 15: CPT | Performed by: PHYSICAL THERAPIST

## 2024-05-16 NOTE — PROGRESS NOTES
Physical Therapy     Physical Therapy Evaluation and Treatment      Patient Name:   Glen Reynolds   1975     Encounter Diagnosis   Name Primary?    Peroneal tendinitis of right lower extremity Yes        THERAPY VISIT NUMBER:   2    Today's Date: 5-16-24    REFERRING DR. BURNS     SUBJECTIVE:         I GOT FITTED FOR INSERTS ON 5-15-24     GOALS:  DECREASE LATERAL ANKLE PAIN     OBJECTIVE:   SEE FLOW SHEET     ASSESSMENT: DEBILITY    PLAN AND TREATMENT:  SEE FLOW SHEET PROGRAM IN CHART:    1 X WEEKLY

## 2024-05-24 ENCOUNTER — TREATMENT (OUTPATIENT)
Dept: PHYSICAL THERAPY | Facility: CLINIC | Age: 49
End: 2024-05-24
Payer: OTHER GOVERNMENT

## 2024-05-24 DIAGNOSIS — M76.71 PERONEAL TENDINITIS OF RIGHT LOWER EXTREMITY: Primary | ICD-10-CM

## 2024-05-24 PROCEDURE — 97140 MANUAL THERAPY 1/> REGIONS: CPT | Performed by: PHYSICAL THERAPIST

## 2024-05-24 PROCEDURE — 97035 APP MDLTY 1+ULTRASOUND EA 15: CPT | Performed by: PHYSICAL THERAPIST

## 2024-05-24 NOTE — PROGRESS NOTES
Physical Therapy     Physical Therapy Evaluation and Treatment      Patient Name:  Glen Reynolds    1975     Encounter Diagnosis   Name Primary?    Peroneal tendinitis of right lower extremity Yes        THERAPY VISIT NUMBER:   3    Today's Date: 5-24-24    REFERRING DR. BURNS     SUBJECTIVE:          PAINFUL LATERAL FOOT     GOALS:  SEE FLOW SHEET     OBJECTIVE: DEBILITY     ASSESSMENT:  SEE FLOW SHEET    PLAN AND TREATMENT:  SEE FLOW SHEET PROGRAM IN CHART:    1 X WEEKLY

## 2024-05-30 ENCOUNTER — TREATMENT (OUTPATIENT)
Dept: PHYSICAL THERAPY | Facility: CLINIC | Age: 49
End: 2024-05-30
Payer: OTHER GOVERNMENT

## 2024-05-30 DIAGNOSIS — M76.71 PERONEAL TENDINITIS OF RIGHT LOWER EXTREMITY: Primary | ICD-10-CM

## 2024-05-30 PROCEDURE — 97140 MANUAL THERAPY 1/> REGIONS: CPT | Performed by: PHYSICAL THERAPIST

## 2024-05-30 PROCEDURE — 97035 APP MDLTY 1+ULTRASOUND EA 15: CPT | Performed by: PHYSICAL THERAPIST

## 2024-05-30 NOTE — PROGRESS NOTES
"Physical Therapy      Physical Therapy Treatment      Patient Name: Glen Reynolds \" John\"    MRN:[id number]     Today's Date: 5/30/24     REFERRING DR Garcia      SUBJECTIVE:  pt is doing slightly better his back is sore and so he does not notice his R foot as much             GOALS:  painfree R foot           OBJECTIVE:  full tx see exercise flowsheet             ASSESSMENT: advised short compression socks along with some ice in water/cold pack in the PM , lateral and volar aspect of R foot is swollen           PLAN/TREATMENT/VISIT:     continue 1 x weekly  "

## 2024-06-06 ENCOUNTER — TREATMENT (OUTPATIENT)
Dept: PHYSICAL THERAPY | Facility: CLINIC | Age: 49
End: 2024-06-06
Payer: OTHER GOVERNMENT

## 2024-06-06 DIAGNOSIS — M76.71 PERONEAL TENDINITIS OF RIGHT LOWER EXTREMITY: Primary | ICD-10-CM

## 2024-06-06 PROCEDURE — 97140 MANUAL THERAPY 1/> REGIONS: CPT | Performed by: PHYSICAL THERAPIST

## 2024-06-06 PROCEDURE — 97035 APP MDLTY 1+ULTRASOUND EA 15: CPT | Performed by: PHYSICAL THERAPIST

## 2024-06-06 NOTE — PROGRESS NOTES
"Physical Therapy      Physical Therapy Treatment      Patient Name: Glen Reynolds \" John\"    MRN:[id number]     Today's Date:6/6/24       REFERRING DR Garcia      SUBJECTIVE:  pt states it is better has not had a chance to get some recovery shoes and on that R foot he definetly needs a size longer shoe             GOALS:  painfree foot (R)          OBJECTIVE:  full tx see exercise flowsheet             ASSESSMENT: pt doing better, he is going to buy [employer] recovery slides/new dress/work shoes          PLAN/TREATMENT/VISIT:     continue 1 more visit  "

## 2024-06-14 ENCOUNTER — APPOINTMENT (OUTPATIENT)
Dept: PHYSICAL THERAPY | Facility: CLINIC | Age: 49
End: 2024-06-14
Payer: OTHER GOVERNMENT

## 2024-06-14 DIAGNOSIS — M76.71 PERONEAL TENDINITIS OF RIGHT LOWER EXTREMITY: Primary | ICD-10-CM

## 2024-06-14 PROCEDURE — 97035 APP MDLTY 1+ULTRASOUND EA 15: CPT | Performed by: PHYSICAL THERAPIST

## 2024-06-14 PROCEDURE — 97140 MANUAL THERAPY 1/> REGIONS: CPT | Performed by: PHYSICAL THERAPIST

## 2024-06-14 NOTE — PROGRESS NOTES
Physical Therapy      Physical Therapy Treatment      Patient Name: Glen Reynolds     MRN:[id number]      Today's Date: 06/14/24      REFERRING DR Garcia        SUBJECTIVE:  pt states he is doing pretty good, he did not have pain yesterday             GOALS:  Painfree R foot (lateral)           OBJECTIVE:  full tx see exercise flowsheet , orthotics were dispensed today with instructional sheet , patient received two pair one sport orthotic, and other was a carbon fiber insert for dress shoes.             ASSESSMENT: pt did well , his foot is not swollen at all , he was given info on buy compression socks ( short), and he will try both orthotics in his shoes and let us know how he is doing, he is extremely busy.           PLAN/TREATMENT/VISIT:     pt received orthotics- will call us

## 2024-07-01 ENCOUNTER — APPOINTMENT (OUTPATIENT)
Dept: PRIMARY CARE | Facility: CLINIC | Age: 49
End: 2024-07-01
Payer: OTHER GOVERNMENT

## 2024-07-01 DIAGNOSIS — E53.8 B12 DEFICIENCY: Primary | ICD-10-CM

## 2024-07-01 PROCEDURE — 96372 THER/PROPH/DIAG INJ SC/IM: CPT | Performed by: INTERNAL MEDICINE

## 2024-07-01 RX ORDER — CYANOCOBALAMIN 1000 UG/ML
1000 INJECTION, SOLUTION INTRAMUSCULAR; SUBCUTANEOUS ONCE
Status: COMPLETED | OUTPATIENT
Start: 2024-07-01 | End: 2024-07-01

## 2024-07-05 DIAGNOSIS — R89.9 ABNORMAL LABORATORY TEST RESULT: ICD-10-CM

## 2024-07-19 ENCOUNTER — DOCUMENTATION (OUTPATIENT)
Dept: PHYSICAL THERAPY | Facility: CLINIC | Age: 49
End: 2024-07-19
Payer: OTHER GOVERNMENT

## 2024-07-19 DIAGNOSIS — M76.71 PERONEAL TENDINITIS OF RIGHT LOWER EXTREMITY: Primary | ICD-10-CM

## 2024-07-19 NOTE — PROGRESS NOTES
"Physical Therapy    Discharge Summary    Name: Glen Reynolds \"John\"  MRN: [id number]  : 1975  Date: 24    Discharge Summary: PT        Therapy Summary: Pt had 6 visits of PT and received orthotics     Discharge Status: HEP     Rehab Discharge Reason: Achieved all and/or the most significant goals(s)  "

## 2024-07-31 ENCOUNTER — LAB (OUTPATIENT)
Dept: LAB | Facility: LAB | Age: 49
End: 2024-07-31
Payer: OTHER GOVERNMENT

## 2024-07-31 DIAGNOSIS — R89.9 ABNORMAL LABORATORY TEST RESULT: ICD-10-CM

## 2024-07-31 LAB
25(OH)D3 SERPL-MCNC: 38 NG/ML (ref 30–100)
ALBUMIN SERPL BCP-MCNC: 4.3 G/DL (ref 3.4–5)
ALP SERPL-CCNC: 64 U/L (ref 33–120)
ALT SERPL W P-5'-P-CCNC: 22 U/L (ref 10–52)
ANION GAP SERPL CALC-SCNC: 13 MMOL/L (ref 10–20)
AST SERPL W P-5'-P-CCNC: 19 U/L (ref 9–39)
BASOPHILS # BLD AUTO: 0.08 X10*3/UL (ref 0–0.1)
BASOPHILS NFR BLD AUTO: 1.5 %
BILIRUB SERPL-MCNC: 1.3 MG/DL (ref 0–1.2)
BUN SERPL-MCNC: 17 MG/DL (ref 6–23)
CALCIUM SERPL-MCNC: 9.3 MG/DL (ref 8.6–10.6)
CHLORIDE SERPL-SCNC: 107 MMOL/L (ref 98–107)
CHOLEST SERPL-MCNC: 149 MG/DL (ref 0–199)
CHOLESTEROL/HDL RATIO: 2.8
CO2 SERPL-SCNC: 28 MMOL/L (ref 21–32)
CREAT SERPL-MCNC: 0.88 MG/DL (ref 0.5–1.3)
EGFRCR SERPLBLD CKD-EPI 2021: >90 ML/MIN/1.73M*2
EOSINOPHIL # BLD AUTO: 0.16 X10*3/UL (ref 0–0.7)
EOSINOPHIL NFR BLD AUTO: 3 %
ERYTHROCYTE [DISTWIDTH] IN BLOOD BY AUTOMATED COUNT: 12.5 % (ref 11.5–14.5)
GLUCOSE SERPL-MCNC: 93 MG/DL (ref 74–99)
HCT VFR BLD AUTO: 44.8 % (ref 41–52)
HDLC SERPL-MCNC: 53.1 MG/DL
HGB BLD-MCNC: 14.4 G/DL (ref 13.5–17.5)
IMM GRANULOCYTES # BLD AUTO: 0.02 X10*3/UL (ref 0–0.7)
IMM GRANULOCYTES NFR BLD AUTO: 0.4 % (ref 0–0.9)
LDLC SERPL CALC-MCNC: 75 MG/DL
LYMPHOCYTES # BLD AUTO: 1.93 X10*3/UL (ref 1.2–4.8)
LYMPHOCYTES NFR BLD AUTO: 35.7 %
MCH RBC QN AUTO: 28.3 PG (ref 26–34)
MCHC RBC AUTO-ENTMCNC: 32.1 G/DL (ref 32–36)
MCV RBC AUTO: 88 FL (ref 80–100)
MONOCYTES # BLD AUTO: 0.42 X10*3/UL (ref 0.1–1)
MONOCYTES NFR BLD AUTO: 7.8 %
NEUTROPHILS # BLD AUTO: 2.79 X10*3/UL (ref 1.2–7.7)
NEUTROPHILS NFR BLD AUTO: 51.6 %
NON HDL CHOLESTEROL: 96 MG/DL (ref 0–149)
NRBC BLD-RTO: 0 /100 WBCS (ref 0–0)
PLATELET # BLD AUTO: 312 X10*3/UL (ref 150–450)
POTASSIUM SERPL-SCNC: 5 MMOL/L (ref 3.5–5.3)
PROT SERPL-MCNC: 6.8 G/DL (ref 6.4–8.2)
RBC # BLD AUTO: 5.09 X10*6/UL (ref 4.5–5.9)
SODIUM SERPL-SCNC: 143 MMOL/L (ref 136–145)
TRIGL SERPL-MCNC: 103 MG/DL (ref 0–149)
VLDL: 21 MG/DL (ref 0–40)
WBC # BLD AUTO: 5.4 X10*3/UL (ref 4.4–11.3)

## 2024-07-31 PROCEDURE — 36415 COLL VENOUS BLD VENIPUNCTURE: CPT

## 2024-08-07 DIAGNOSIS — J30.9 ALLERGIC RHINITIS, UNSPECIFIED SEASONALITY, UNSPECIFIED TRIGGER: ICD-10-CM

## 2024-08-07 DIAGNOSIS — E78.5 HYPERLIPIDEMIA, UNSPECIFIED HYPERLIPIDEMIA TYPE: ICD-10-CM

## 2024-08-07 RX ORDER — FLUTICASONE PROPIONATE 50 MCG
2 SPRAY, SUSPENSION (ML) NASAL DAILY
Qty: 16 G | Refills: 1 | Status: SHIPPED | OUTPATIENT
Start: 2024-08-07 | End: 2025-02-03

## 2024-08-07 RX ORDER — ROSUVASTATIN CALCIUM 5 MG/1
5 TABLET, COATED ORAL NIGHTLY
Qty: 90 TABLET | Refills: 2 | Status: SHIPPED | OUTPATIENT
Start: 2024-08-07

## 2024-08-09 ENCOUNTER — APPOINTMENT (OUTPATIENT)
Dept: PRIMARY CARE | Facility: CLINIC | Age: 49
End: 2024-08-09
Payer: OTHER GOVERNMENT

## 2024-08-09 VITALS
RESPIRATION RATE: 16 BRPM | DIASTOLIC BLOOD PRESSURE: 78 MMHG | SYSTOLIC BLOOD PRESSURE: 122 MMHG | HEART RATE: 68 BPM | HEIGHT: 68 IN | OXYGEN SATURATION: 98 % | WEIGHT: 189 LBS | BODY MASS INDEX: 28.64 KG/M2

## 2024-08-09 DIAGNOSIS — Z12.11 SCREENING FOR MALIGNANT NEOPLASM OF COLON: ICD-10-CM

## 2024-08-09 DIAGNOSIS — R23.9 SKIN CHANGE: ICD-10-CM

## 2024-08-09 DIAGNOSIS — Z00.00 HEALTHCARE MAINTENANCE: Primary | ICD-10-CM

## 2024-08-09 PROCEDURE — 1036F TOBACCO NON-USER: CPT | Performed by: INTERNAL MEDICINE

## 2024-08-09 PROCEDURE — 99396 PREV VISIT EST AGE 40-64: CPT | Performed by: INTERNAL MEDICINE

## 2024-08-09 PROCEDURE — 3008F BODY MASS INDEX DOCD: CPT | Performed by: INTERNAL MEDICINE

## 2024-08-09 ASSESSMENT — ENCOUNTER SYMPTOMS
LOSS OF SENSATION IN FEET: 0
DEPRESSION: 0
OCCASIONAL FEELINGS OF UNSTEADINESS: 0

## 2024-08-09 ASSESSMENT — PATIENT HEALTH QUESTIONNAIRE - PHQ9
SUM OF ALL RESPONSES TO PHQ9 QUESTIONS 1 AND 2: 0
2. FEELING DOWN, DEPRESSED OR HOPELESS: NOT AT ALL
1. LITTLE INTEREST OR PLEASURE IN DOING THINGS: NOT AT ALL

## 2024-08-09 ASSESSMENT — COLUMBIA-SUICIDE SEVERITY RATING SCALE - C-SSRS
6. HAVE YOU EVER DONE ANYTHING, STARTED TO DO ANYTHING, OR PREPARED TO DO ANYTHING TO END YOUR LIFE?: NO
1. IN THE PAST MONTH, HAVE YOU WISHED YOU WERE DEAD OR WISHED YOU COULD GO TO SLEEP AND NOT WAKE UP?: NO
2. HAVE YOU ACTUALLY HAD ANY THOUGHTS OF KILLING YOURSELF?: NO

## 2024-08-09 NOTE — PROGRESS NOTES
"Subjective   Patient ID: Glen Reynolds \"John\" is a 48 y.o. male who presents for Annual Exam.    HPI  Patient in for a visit  Was not able to exercise as much due to plantar fasciitis, he got inserts and is better , he has lost weight due to diet changes only  Vitamin D on low normal , not taking anymore since the high dose begof the year     Patient comes in for a physical exam last one done over a year ago , doing well over-all with no particular complaints. Also is in for laboratory review and health maintenance update.  Updating family history as well.  Interval event - past medical history, surgical, social, and family history reviewed and updated.  Interval care -  Patient is    up to date with dental care.  Patient does     receive routine vision care.    Review of Systems  General: Denies fever, chills, night sweats, changes in appetite or weight  ENT: Negative for ear pain, hearing loss, headache, difficulty swallowing, up to date with dental checks    Eyes: Negative for recent visual changes, up to date with eye exams just got glasses   Dermatologic: Negative for new skin conditions, rash  Respiratory: Negative for paroxysmal nocturnal dyspnea, wheezing,shortness of breath, cough  Cardiovascular: Negative for chest pain, palpitations, or leg swelling  Gastrointestinal: Negative for nausea/vomiting, abdominal pain, changes in bowel habits  Genitourinary: Negative for dysuria, urgency, frequency  URINARY INCONTINENCE   Neurological: Negative for headaches, tremors, dizziness, memory loss, confusion, weakness, paresthesias  Psychiatric: Negative for sleep problems, anxiety, depression, conditions are stable  Endocrine: Negative for heat or cold intolerance, polyuria, polydipsia  Other:All systems have been reviewed and are negative except as previously noted.    Previous history  Past Medical History:   Diagnosis Date    Abnormal weight gain 07/29/2022    Weight gain    Acute laryngitis 11/17/2022    " Laryngitis    Cough, unspecified 11/17/2022    Cough    Disorder of the skin and subcutaneous tissue, unspecified 02/27/2021    Benign skin lesion of multiple sites    Fever, unspecified 07/29/2022    Fever    Hyperlipidemia, unspecified 12/29/2022    Hyperlipidemia    Myalgia, unspecified site 07/29/2022    Myalgia    Nasal congestion 07/28/2022    Sinus congestion    Other seasonal allergic rhinitis     Seasonal allergies    Personal history of other diseases of the respiratory system 10/31/2019    History of acute bronchitis    Personal history of other mental and behavioral disorders     History of depression    Sneezing 04/05/2022    Sneezing     Past Surgical History:   Procedure Laterality Date    OTHER SURGICAL HISTORY  10/31/2019    No history of surgery     Social History     Tobacco Use    Smoking status: Never    Smokeless tobacco: Never   Vaping Use    Vaping status: Never Used   Substance Use Topics    Alcohol use: Not Currently    Drug use: Never     Family History   Problem Relation Name Age of Onset    Diabetes Mother      Depression Mother          major    COPD Father      Diabetes Father      Other (spinal stenosis) Father       Allergies   Allergen Reactions    Latex Unknown     Current Outpatient Medications   Medication Instructions    fexofenadine (Allegra) 180 mg tablet 1 tablet, oral, Daily    fluticasone (Flonase) 50 mcg/actuation nasal spray 2 sprays, Each Nostril, Daily    rosuvastatin (CRESTOR) 5 mg, oral, Nightly    valACYclovir (Valtrex) 1 gram tablet oral       Objective       Physical Exam  Vital Signs: as recorded above  General: Well groomed, well nourished   Orientation:  Alert , oriented to time, place , and person   Mood and Affect:  Cooperative , no apparent distress normal affect  Skin: Good color, good turgor  Eyes: Extra ocular muscle movements intact, anicteric sclerae  Neck: Supple, full range of movement  Chest: Normal breath sounds, normal chest wall exam, symmetric,  "good air entry, clear to auscultation  Heart: Regular rate and rhythm, without murmur, gallop, or rubs  Abdomen soft nontender no masses felt no hepatosplenomegaly, no rebound or guarding  BACK:  no CTLS spine tenderness, no flank tenderness  Extremities: full range of movement  bilateral UE and bilateral LE,  no lower extremity edema  Neurological: Alert, oriented, cranial nerves II-XII intact except for visual acuity  Sensation:  Intact   Gait: normal steady      Assessment/Plan   Glen Reynolds \"John\" is a 48 y.o. male who presents for the concerns below:    Problem List Items Addressed This Visit    None  ELEVATED BLOOD PRESSURE PLAN it  was better on manual recheck   fair control of blood pressure not on medication, need to optimize exercise weight loss no added salt hydration restorative sleep ,   patient does not smoke or drink etoh , if with any caffeine intake to cut down  .   DASH diet to lower blood pressure is printed and given to patient    if bp on recheck is still high we may need to start medication to lower blood pressure  make sure renal function tests are up to date if not Obtain BMP, urine microalbumin, ophthalmology exam  if with persistent headache, dizziness spells, blurred vision come in as soon a possible recheck if persistent and worsening patient will need to go to Emergency Department  Continue to cut back on 1800 rafa a day,   Fluid retention cut back on salt     VITAMIN D DEFICIENCY low levels in the past  ,will check  if below 26 will restart high dose again for another course , if normal will continue dialy dose  Vitamin D3 , caution on other supplements or medications that may go through liver      HYPERCHOLESTEROLEMIA PLAN: stable  continue current medications  Follow low cholesterol diet, encouraged high omega 3 fatty acid intake in diet, exercise, weight control. . Will Obtain AST/ALT, fasting lipid profile, creatine kinase  on statin if not done within past 3-6 months . Coenzyme " Q10 200 mg a day if able to help increase HDL.  Cardiac ct 0 score    Plantar fasciitis in resolution, will start exercise gradually to avoid aggravating the foot wears compression socks since travelling a  lot    ASSESSMENT AND PLAN: Patient on examination is in good health , concerns above, screening blood tests to screen for high cholesterol, diabetes, thyroid, Prostate Surface Antigen (PSA) for age 50 and above sooner if with family history of prostate cancer or with symptoms.   Preventive Medicine: colon cancer screening by age 45 if no family history, balanced diet, and exercise as discussed. Seat belt use for injury prevention  Substance use and /or tobacco use not applicable / counseled when applicable. Immunizations TD  up to date.  Yearly flu vaccine unless contraindicated .Patient should be taking enough calcium in a balanced die More than 50% of office visit time spent counseling the patient, questions were answered. Complete physical examination in a year  Patient knows either has access to  [email or web address] to see results and messages regarding these or will call us if cannot see them         Discussed with:   Return in : 6 months     Portions of this note were generated using digital voice recognition software, and may contain grammatical errors       Miya Callaway MD  08/09/24  10:49 AM

## 2024-08-09 NOTE — PATIENT INSTRUCTIONS
COLONOSCOPY SCHEDULING   [facility]                                            [phone]  [address] Dr Baljit Moon, Dr Jamal Pérez, [address], [place], [place]   and [facility]               [phone] Dr Norma Mishra      Dermatology Dr Amaya ,  Dr Marlon Escobedo    Try to get Vit D3 daily 2,000 int unit increase during winter 3-4,000

## 2024-11-02 ENCOUNTER — OFFICE VISIT (OUTPATIENT)
Dept: URGENT CARE | Age: 49
End: 2024-11-02
Payer: OTHER GOVERNMENT

## 2024-11-02 VITALS
WEIGHT: 187 LBS | DIASTOLIC BLOOD PRESSURE: 80 MMHG | SYSTOLIC BLOOD PRESSURE: 119 MMHG | TEMPERATURE: 98.3 F | HEART RATE: 67 BPM | OXYGEN SATURATION: 96 % | BODY MASS INDEX: 28.43 KG/M2

## 2024-11-02 DIAGNOSIS — J01.90 ACUTE NON-RECURRENT SINUSITIS, UNSPECIFIED LOCATION: Primary | ICD-10-CM

## 2024-11-02 RX ORDER — AMOXICILLIN AND CLAVULANATE POTASSIUM 875; 125 MG/1; MG/1
1 TABLET, FILM COATED ORAL 2 TIMES DAILY
Qty: 14 TABLET | Refills: 0 | Status: SHIPPED | OUTPATIENT
Start: 2024-11-02 | End: 2024-11-09

## 2024-11-02 ASSESSMENT — ENCOUNTER SYMPTOMS
COUGH: 1
HEADACHES: 1
SINUS PRESSURE: 1
SINUS COMPLAINT: 1
SINUS PAIN: 1

## 2024-11-09 ENCOUNTER — OFFICE VISIT (OUTPATIENT)
Dept: URGENT CARE | Age: 49
End: 2024-11-09
Payer: OTHER GOVERNMENT

## 2024-11-09 VITALS
WEIGHT: 186 LBS | SYSTOLIC BLOOD PRESSURE: 121 MMHG | BODY MASS INDEX: 28.28 KG/M2 | DIASTOLIC BLOOD PRESSURE: 76 MMHG | TEMPERATURE: 98.4 F | RESPIRATION RATE: 16 BRPM | OXYGEN SATURATION: 96 % | HEART RATE: 72 BPM

## 2024-11-09 DIAGNOSIS — J01.10 ACUTE NON-RECURRENT FRONTAL SINUSITIS: Primary | ICD-10-CM

## 2024-11-09 RX ORDER — AZITHROMYCIN 250 MG/1
TABLET, FILM COATED ORAL
Qty: 6 TABLET | Refills: 0 | Status: SHIPPED | OUTPATIENT
Start: 2024-11-09 | End: 2024-11-14

## 2024-11-09 NOTE — PROGRESS NOTES
"Subjective   Patient ID: Glen Reynolds \"John\" is a 49 y.o. male. They present today with a chief complaint of Sinusitis and Nasal Congestion.    History of Present Illness  HPI  Pt is a 49 yr old male who was seen a week ago for sinusitis but is not getting any better with the treatment that was given.  His kids received a zpack and they are better their doctor told him to be reevaluated  Past Medical History  Allergies as of 11/09/2024 - Reviewed 11/09/2024   Allergen Reaction Noted    Latex Unknown 06/09/2022       (Not in a hospital admission)         Past Medical History:   Diagnosis Date    Abnormal weight gain 07/29/2022    Weight gain    Acute laryngitis 11/17/2022    Laryngitis    Cough, unspecified 11/17/2022    Cough    Disorder of the skin and subcutaneous tissue, unspecified 02/27/2021    Benign skin lesion of multiple sites    Fever, unspecified 07/29/2022    Fever    Hyperlipidemia, unspecified 12/29/2022    Hyperlipidemia    Myalgia, unspecified site 07/29/2022    Myalgia    Nasal congestion 07/28/2022    Sinus congestion    Other seasonal allergic rhinitis     Seasonal allergies    Personal history of other diseases of the respiratory system 10/31/2019    History of acute bronchitis    Personal history of other mental and behavioral disorders     History of depression    Sneezing 04/05/2022    Sneezing       Past Surgical History:   Procedure Laterality Date    OTHER SURGICAL HISTORY  10/31/2019    No history of surgery        reports that he has never smoked. He has never used smokeless tobacco. He reports that he does not currently use alcohol. He reports that he does not use drugs.    Review of Systems  Review of Systems  CONSTITUTIONAL: No for fever, no chills, no recent weight loss + headache  EYES: No pain, No redness, No swelling  EARS: No discharge, No pain, No hearing loss  NOSE: + congestion, No discharge, No bleeding  MOUTH: No throat pain, No hoarseness  NECK: No pain, No stiffness, No " swollen glands  CARDIOVASCULAR: No chest pain, No edema, No irregular rhythm, No palpitations  RESPIRATORY: No cough, No dyspnea  GI: No abdominal pain, No constipation or diarrhea, No N/V  : No urgency, No dysuria, No increase or decrease in urine output, No hematuria  MUSCULOSKELETAL: No back pain, No joint pain, No swelling, No weakness  SKIN: No rash, No lesions, No abrasions  NEURO: No dizziness, No alteration in mentation, No headache, No loss of consciousness, No ataxia  PSYCH: No anxiety, No depression, No SI or HI                             Objective    Vitals:    11/09/24 1732   BP: 121/76   Pulse: 72   Resp: 16   Temp: 36.9 °C (98.4 °F)   SpO2: 96%   Weight: 84.4 kg (186 lb)     No LMP for male patient.    Physical Exam  Gen.: Vitals noted no distress afebrile. Normal phonation, no stridor, no trismus.   ENT: TMs clear bilaterally, EACs unremarkable. Mastoids nontender. Posterior oropharynx without erythema, exudate, or swelling. Uvula is in the midline and nonedematous. No Evan's Angina. + rhinitis and maxillary sinus pressure on palpation  Neck: Supple. No meningismus through full range of motion. No lymphadenopathy.   Cardiac: Regular rate rhythm no murmur.   Lungs: Good aeration throughout. No adventitious breath sounds.   Abdomen: Soft nontender nonsurgical throughout. Normoactive bowel sounds.   Extremities: No peripheral edema, negative Homans bilaterally no cords.   Skin: No rash.   Neuro: No focal neurologic deficits. NIH score is 0.  Procedures    Point of Care Test & Imaging Results from this visit    No results found.    Diagnostic study results (if any) were reviewed by RAKESH Rose.    Assessment/Plan   Allergies, medications, history, and pertinent labs/EKGs/Imaging reviewed by RAKESH Rose.     Medical Decision Making  History and physical is consistent with sinus infection.  Covid negative.  No signs of OM, OE, Strep.  Pt was prescribed abx and will  use  flonase along with antihistamine and tylenol or motrin.  F/U with pcp    Orders and Diagnoses  There are no diagnoses linked to this encounter.      Medical Admin Record      Follow Up Instructions  No follow-ups on file.Pt was prescribed abx and will  use flonase along with antihistamine and tylenol or motrin.  F/U with pcp    Patient disposition:    Electronically signed by RAKESH Rose  6:18 PM

## 2024-12-23 DIAGNOSIS — Z12.11 COLON CANCER SCREENING: Primary | ICD-10-CM

## 2024-12-23 RX ORDER — POLYETHYLENE GLYCOL 3350, SODIUM SULFATE, SODIUM CHLORIDE, POTASSIUM CHLORIDE, SODIUM ASCORBATE, AND ASCORBIC ACID 7.5-2.691G
KIT ORAL
Qty: 1 EACH | Refills: 0 | Status: SHIPPED | OUTPATIENT
Start: 2024-12-23

## 2025-01-29 DIAGNOSIS — E78.5 HYPERLIPIDEMIA, UNSPECIFIED HYPERLIPIDEMIA TYPE: ICD-10-CM

## 2025-01-29 DIAGNOSIS — E53.8 B12 DEFICIENCY: ICD-10-CM

## 2025-01-29 DIAGNOSIS — E55.9 VITAMIN D DEFICIENCY: ICD-10-CM

## 2025-01-29 DIAGNOSIS — Z12.5 SCREENING FOR PROSTATE CANCER: Primary | ICD-10-CM

## 2025-02-06 LAB
25(OH)D3+25(OH)D2 SERPL-MCNC: 42 NG/ML (ref 30–100)
ALBUMIN SERPL-MCNC: 4.6 G/DL (ref 3.6–5.1)
ALP SERPL-CCNC: 62 U/L (ref 36–130)
ALT SERPL-CCNC: 32 U/L (ref 9–46)
ANION GAP SERPL CALCULATED.4IONS-SCNC: 9 MMOL/L (CALC) (ref 7–17)
AST SERPL-CCNC: 20 U/L (ref 10–40)
BILIRUB SERPL-MCNC: 1.3 MG/DL (ref 0.2–1.2)
BUN SERPL-MCNC: 19 MG/DL (ref 7–25)
CALCIUM SERPL-MCNC: 9.2 MG/DL (ref 8.6–10.3)
CHLORIDE SERPL-SCNC: 105 MMOL/L (ref 98–110)
CHOLEST SERPL-MCNC: 155 MG/DL
CHOLEST/HDLC SERPL: 2.6 (CALC)
CO2 SERPL-SCNC: 28 MMOL/L (ref 20–32)
CREAT SERPL-MCNC: 0.83 MG/DL (ref 0.6–1.29)
EGFRCR SERPLBLD CKD-EPI 2021: 107 ML/MIN/1.73M2
GLUCOSE SERPL-MCNC: 87 MG/DL (ref 65–99)
HDLC SERPL-MCNC: 59 MG/DL
LDLC SERPL CALC-MCNC: 80 MG/DL (CALC)
NONHDLC SERPL-MCNC: 96 MG/DL (CALC)
POTASSIUM SERPL-SCNC: 4.8 MMOL/L (ref 3.5–5.3)
PROT SERPL-MCNC: 7 G/DL (ref 6.1–8.1)
PSA SERPL-MCNC: 0.8 NG/ML
SODIUM SERPL-SCNC: 142 MMOL/L (ref 135–146)
TRIGL SERPL-MCNC: 77 MG/DL
TSH SERPL-ACNC: 1.93 MIU/L (ref 0.4–4.5)

## 2025-02-07 ENCOUNTER — APPOINTMENT (OUTPATIENT)
Dept: PRIMARY CARE | Facility: CLINIC | Age: 50
End: 2025-02-07
Payer: OTHER GOVERNMENT

## 2025-02-07 VITALS
DIASTOLIC BLOOD PRESSURE: 78 MMHG | HEART RATE: 79 BPM | WEIGHT: 195 LBS | SYSTOLIC BLOOD PRESSURE: 122 MMHG | RESPIRATION RATE: 16 BRPM | BODY MASS INDEX: 29.65 KG/M2 | OXYGEN SATURATION: 97 %

## 2025-02-07 DIAGNOSIS — F32.A DEPRESSION, CONTROLLED: ICD-10-CM

## 2025-02-07 DIAGNOSIS — G47.30 SLEEP APNEA, UNSPECIFIED TYPE: ICD-10-CM

## 2025-02-07 DIAGNOSIS — E78.5 HYPERLIPIDEMIA, UNSPECIFIED HYPERLIPIDEMIA TYPE: Primary | ICD-10-CM

## 2025-02-07 DIAGNOSIS — E55.9 VITAMIN D DEFICIENCY: ICD-10-CM

## 2025-02-07 PROCEDURE — 1036F TOBACCO NON-USER: CPT | Performed by: INTERNAL MEDICINE

## 2025-02-07 PROCEDURE — 99214 OFFICE O/P EST MOD 30 MIN: CPT | Performed by: INTERNAL MEDICINE

## 2025-02-07 ASSESSMENT — PATIENT HEALTH QUESTIONNAIRE - PHQ9
1. LITTLE INTEREST OR PLEASURE IN DOING THINGS: NOT AT ALL
2. FEELING DOWN, DEPRESSED OR HOPELESS: NOT AT ALL
SUM OF ALL RESPONSES TO PHQ9 QUESTIONS 1 AND 2: 0

## 2025-02-07 ASSESSMENT — ENCOUNTER SYMPTOMS
DEPRESSION: 0
OCCASIONAL FEELINGS OF UNSTEADINESS: 0
LOSS OF SENSATION IN FEET: 0

## 2025-02-07 NOTE — PROGRESS NOTES
"Subjective   Patient ID: Glen Reynolds \"John\" is a 49 y.o. male who presents for FUV.    HPI  Patient in for a visit  Wife still going through morning sickness then dehydration then faints   Eldest child has a cold   Moved his colonoscopy to [place]   Vit d taking 1000 a day   Review of Systems  General: Denies fever, chills, night sweats,  Eyes: Negative for recent visual changes  Ears, Nose, Throat :  Negative for hearing changes, sinus discomfort  Dermatologic: Negative for new skin conditions, rash  Respiratory: Negative for wheezing, shortness of breath, cough  Cardiovascular: Negative for chest pain, palpitations, or leg swelling  Gastrointestinal: Negative for nausea/vomiting, abdominal pain, changes in bowel habits  Genitourinary Negative for Urinary Incontinence  urgency , frequency, discomfort   Musculoskeletal: see hpi  Neurological: Negative for headaches, dizziness    Previous history  Past Medical History:   Diagnosis Date    Abnormal weight gain 07/29/2022    Weight gain    Acute laryngitis 11/17/2022    Laryngitis    Cough, unspecified 11/17/2022    Cough    Disorder of the skin and subcutaneous tissue, unspecified 02/27/2021    Benign skin lesion of multiple sites    Fever, unspecified 07/29/2022    Fever    Hyperlipidemia, unspecified 12/29/2022    Hyperlipidemia    Myalgia, unspecified site 07/29/2022    Myalgia    Nasal congestion 07/28/2022    Sinus congestion    Other seasonal allergic rhinitis     Seasonal allergies    Personal history of other diseases of the respiratory system 10/31/2019    History of acute bronchitis    Personal history of other mental and behavioral disorders     History of depression    Sneezing 04/05/2022    Sneezing     Past Surgical History:   Procedure Laterality Date    OTHER SURGICAL HISTORY  10/31/2019    No history of surgery     Social History     Tobacco Use    Smoking status: Never    Smokeless tobacco: Never    Tobacco comments:     Always hated the smell of the " "stuff   Vaping Use    Vaping status: Never Used   Substance Use Topics    Alcohol use: Never    Drug use: Never     Family History   Problem Relation Name Age of Onset    Diabetes Mother      Depression Mother          major    COPD Father      Diabetes Father      Other (spinal stenosis) Father       Allergies   Allergen Reactions    Latex Unknown     Current Outpatient Medications   Medication Instructions    fexofenadine (Allegra) 180 mg tablet 1 tablet, Daily    fluticasone (Flonase) 50 mcg/actuation nasal spray 2 sprays, Each Nostril, Daily    peg-sod sul-NaCl-KCl-asb-C (MoviPrep) 100-7.5-2.691 gram solution Schedule the initial dose the evening before colonoscopy, followed by a second dose the morning of procedure (2 day regimen) or ~ 90 minutes after starting dose (1 day regimen).    rosuvastatin (CRESTOR) 5 mg, oral, Nightly       Objective       Physical Exam  Vital Signs: as recorded above  General: Well groomed, well nourished   Orientation:  Alert , oriented to time, place , and person   Mood and Affect:  Cooperative , no apparent distress normal affect  Skin: Good color, good turgor  Eyes: Extra ocular muscle movements intact, anicteric sclerae  Neck: Supple, full range of movement  Chest: Normal breath sounds, normal chest wall exam, symmetric, good air entry, clear to auscultation  Heart: Regular rate and rhythm, without murmur, gallop, or rubs  BACK:  no CTLS spine tenderness, no flank tenderness  Extremities: full range of movement  bilateral UE and bilateral LE,  no lower extremity edema  Neurological: Alert, oriented, cranial nerves II-XII grossly intact except for visual acuity  Sensation:  Intact   Gait: normal steady      Assessment/Plan   Glen Reynolds \"John\" is a 49 y.o. 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 . Will Obtain AST/ALT, fasting lipid profile, creatine kinase  on statin if not done within past 3-6 months . Coenzyme Q10 200 mg a day if able to help increase HDL.    Energy - drinks coffee and need to remind himself to drink water   Discussed with:   Return in :  5 months baby due in August   Portions of this note were generated using digital voice recognition software, and may contain grammatical errors       Miya Callaway MD  02/07/25  8:37 AM

## 2025-02-16 ENCOUNTER — OFFICE VISIT (OUTPATIENT)
Dept: URGENT CARE | Age: 50
End: 2025-02-16
Payer: OTHER GOVERNMENT

## 2025-02-16 VITALS
OXYGEN SATURATION: 95 % | TEMPERATURE: 98.4 F | RESPIRATION RATE: 16 BRPM | DIASTOLIC BLOOD PRESSURE: 84 MMHG | SYSTOLIC BLOOD PRESSURE: 119 MMHG | HEART RATE: 87 BPM

## 2025-02-16 DIAGNOSIS — R50.9 FEVER, UNSPECIFIED FEVER CAUSE: ICD-10-CM

## 2025-02-16 DIAGNOSIS — J10.1 INFLUENZA A: Primary | ICD-10-CM

## 2025-02-16 DIAGNOSIS — Z20.822 COVID-19 VIRUS RNA NOT DETECTED: ICD-10-CM

## 2025-02-16 LAB
POC RAPID INFLUENZA A: POSITIVE
POC RAPID INFLUENZA B: NEGATIVE
POC SARS-COV-2 AG BINAX: NORMAL

## 2025-02-16 NOTE — PROGRESS NOTES
"Subjective   Patient ID: Glen Reynolds \"John\" is a 49 y.o. male. They present today with a chief complaint of Headache, Nasal Congestion, Fever, Cough, and Sinus Problem (Pt presents with light headness and fatigue and cough. ).    History of Present Illness  48 yo male coming in for headache, nasal congestion, low grade fever, and cough. He states he has felt fatigued as well but states for the last 2 nights he has been up late and then woken up very early. He denies any shortness of breath. HE denies any other complaints.    Past Medical History  Allergies as of 02/16/2025 - Reviewed 02/16/2025   Allergen Reaction Noted    Latex Unknown 06/09/2022       (Not in a hospital admission)       Past Medical History:   Diagnosis Date    Abnormal weight gain 07/29/2022    Weight gain    Acute laryngitis 11/17/2022    Laryngitis    Cough, unspecified 11/17/2022    Cough    Disorder of the skin and subcutaneous tissue, unspecified 02/27/2021    Benign skin lesion of multiple sites    Fever, unspecified 07/29/2022    Fever    Hyperlipidemia, unspecified 12/29/2022    Hyperlipidemia    Myalgia, unspecified site 07/29/2022    Myalgia    Nasal congestion 07/28/2022    Sinus congestion    Other seasonal allergic rhinitis     Seasonal allergies    Personal history of other diseases of the respiratory system 10/31/2019    History of acute bronchitis    Personal history of other mental and behavioral disorders     History of depression    Sneezing 04/05/2022    Sneezing       Past Surgical History:   Procedure Laterality Date    OTHER SURGICAL HISTORY  10/31/2019    No history of surgery        reports that he has never smoked. He has never used smokeless tobacco. He reports that he does not drink alcohol and does not use drugs.    Review of Systems  Review of Systems:  General: No weight loss, fatigue, anorexia, insomnia, positive low grade fever, chills.  ENT: No pharyngitis, dry mouth, positive nasal congestion, no ear " pain  Cardiac: No chest pain, palpitations, syncope, near syncope.  Pulmonary:  No shortness of breath, positive cough, no hemoptysis  Heme/lymph: No swollen glands, fever, bleeding  GI: No abdominal pain, change in bowel habits, melena, hematemesis, hematochezia, nausea, vomiting, or diarrhea  : No discharge, dysuria, frequency, urgency, hematuria  Musculoskeletal: No limb pain, joint pain, joint swelling.  Skin: No rashes  Neuro: No numbness, tingling, positive headaches                                 Objective    Vitals:    02/16/25 1550   BP: 119/84   BP Location: Left arm   Patient Position: Sitting   Pulse: 87   Resp: 16   Temp: 36.9 °C (98.4 °F)   SpO2: 95%     No LMP for male patient.    Physical Exam  Physical Exam:  General: Vital noted, no distress. Afebrile  EENT: Eyes unremarkable, Pupils PERRLA, EOMs intact. TMs unremarkable. Posterior oropharynx unremarkable. Uvula in the midline and non-edematous. No PTA. No retropharyngeal mass. No Evan's angina.  Cardiac: Regular rate and rhythm, no murmur  Pulmonary: Lungs clear bilaterally with good aeration. No adventitious breath sounds.  Skin: No rashes      Procedures    Point of Care Test & Imaging Results from this visit  Results for orders placed or performed in visit on 02/16/25   POCT Covid-19 Rapid Antigen   Result Value Ref Range    POC [place]-COV-2 AG  Presumptive negative test for SARS-CoV-2 (no antigen detected)     Presumptive negative test for SARS-CoV-2 (no antigen detected)   POCT Influenza A/B manually resulted   Result Value Ref Range    POC Rapid Influenza A Positive (A) Negative    POC Rapid Influenza B Negative Negative      No results found.    Diagnostic study results (if any) were reviewed by RAKESH Crockett.    Assessment/Plan   Allergies, medications, history, and pertinent labs/EKGs/Imaging reviewed by RAKESH Crockett.     Medical Decision Making  Testing: rapid covid and flu  Treatment: Advised symptomatic  relief with OTC medications  Differential: 1) covid , 2) flu , 3) uri  Plan: Patient will follow up with the PCP in the next 2-3 days. Return for any worsening symptoms or go to the ER for further evaluation. Patient understands return precautions and discharge insturctions.  Impression:   1) Influenza A      Orders and Diagnoses  Diagnoses and all orders for this visit:  Influenza A  Fever, unspecified fever cause  -     POCT Covid-19 Rapid Antigen  -     POCT Influenza A/B manually resulted  COVID-19 virus RNA not detected      Medical Admin Record      Patient disposition: Home    Electronically signed by RAKESH Crockett  4:30 PM

## 2025-02-19 DIAGNOSIS — J30.9 ALLERGIC RHINITIS, UNSPECIFIED SEASONALITY, UNSPECIFIED TRIGGER: ICD-10-CM

## 2025-02-20 RX ORDER — FLUTICASONE PROPIONATE 50 MCG
2 SPRAY, SUSPENSION (ML) NASAL DAILY
Qty: 16 G | Refills: 11 | Status: SHIPPED | OUTPATIENT
Start: 2025-02-20

## 2025-03-15 ENCOUNTER — OFFICE VISIT (OUTPATIENT)
Dept: URGENT CARE | Age: 50
End: 2025-03-15
Payer: OTHER GOVERNMENT

## 2025-03-15 VITALS
HEART RATE: 72 BPM | BODY MASS INDEX: 29.65 KG/M2 | RESPIRATION RATE: 16 BRPM | TEMPERATURE: 98.8 F | WEIGHT: 195 LBS | DIASTOLIC BLOOD PRESSURE: 85 MMHG | OXYGEN SATURATION: 96 % | SYSTOLIC BLOOD PRESSURE: 126 MMHG

## 2025-03-15 DIAGNOSIS — J01.00 ACUTE NON-RECURRENT MAXILLARY SINUSITIS: Primary | ICD-10-CM

## 2025-03-15 RX ORDER — AMOXICILLIN AND CLAVULANATE POTASSIUM 875; 125 MG/1; MG/1
875 TABLET, FILM COATED ORAL 2 TIMES DAILY
Qty: 20 TABLET | Refills: 0 | Status: SHIPPED | OUTPATIENT
Start: 2025-03-15 | End: 2025-03-25

## 2025-03-15 ASSESSMENT — ENCOUNTER SYMPTOMS
EYES NEGATIVE: 1
HEMATOLOGIC/LYMPHATIC NEGATIVE: 1
CARDIOVASCULAR NEGATIVE: 1
NEUROLOGICAL NEGATIVE: 1
RHINORRHEA: 1
CONSTITUTIONAL NEGATIVE: 1
GASTROINTESTINAL NEGATIVE: 1
ENDOCRINE NEGATIVE: 1
PSYCHIATRIC NEGATIVE: 1
SINUS PRESSURE: 1
MUSCULOSKELETAL NEGATIVE: 1
ALLERGIC/IMMUNOLOGIC NEGATIVE: 1
RESPIRATORY NEGATIVE: 1

## 2025-03-15 NOTE — PROGRESS NOTES
"Subjective   Patient ID: Glen Reynolds \"John\" is a 49 y.o. male. They present today with a chief complaint of Sinusitis (Sinus infection, pressure and bloody mucus since 2/16/2025).    History of Present Illness  Patient is a 50 y/o male presenting with nasal congestion/drainage, sinus pressure, bilateral ear pressure x1 month after Influenza A infection. Patient denies symptoms of fever, chills, bodyaches, lethargy, weakness, chest pain/tightness/pressure, SOB, wheezing, N/V/D, ABD pain. No OTC medication reported to be taken.       Sinusitis  Associated symptoms: congestion and rhinorrhea        Past Medical History  Allergies as of 03/15/2025 - Reviewed 03/15/2025   Allergen Reaction Noted    Latex Unknown 06/09/2022       (Not in a hospital admission)       Past Medical History:   Diagnosis Date    Abnormal weight gain 07/29/2022    Weight gain    Acute laryngitis 11/17/2022    Laryngitis    Cough, unspecified 11/17/2022    Cough    Disorder of the skin and subcutaneous tissue, unspecified 02/27/2021    Benign skin lesion of multiple sites    Fever, unspecified 07/29/2022    Fever    Hyperlipidemia, unspecified 12/29/2022    Hyperlipidemia    Myalgia, unspecified site 07/29/2022    Myalgia    Nasal congestion 07/28/2022    Sinus congestion    Other seasonal allergic rhinitis     Seasonal allergies    Personal history of other diseases of the respiratory system 10/31/2019    History of acute bronchitis    Personal history of other mental and behavioral disorders     History of depression    Sneezing 04/05/2022    Sneezing       Past Surgical History:   Procedure Laterality Date    OTHER SURGICAL HISTORY  10/31/2019    No history of surgery        reports that he has never smoked. He has never used smokeless tobacco. He reports that he does not drink alcohol and does not use drugs.    Review of Systems  Review of Systems   Constitutional: Negative.    HENT:  Positive for congestion, rhinorrhea and sinus pressure.  "        Bi alteral ear pressure   Eyes: Negative.    Respiratory: Negative.     Cardiovascular: Negative.    Gastrointestinal: Negative.    Endocrine: Negative.    Genitourinary: Negative.    Musculoskeletal: Negative.    Skin: Negative.    Allergic/Immunologic: Negative.    Neurological: Negative.    Hematological: Negative.    Psychiatric/Behavioral: Negative.                                    Objective    Vitals:    03/15/25 1424   BP: 126/85   Pulse: 72   Resp: 16   Temp: 37.1 °C (98.8 °F)   TempSrc: Oral   SpO2: 96%   Weight: 88.5 kg (195 lb)     No LMP for male patient.    Physical Exam  Constitutional:       Comments: Patient A/O x4, LOC 5, calm and cooperative. Patient self-ambulatory to treatment area and is in no acute distress.    HENT:      Head: Normocephalic and atraumatic.      Ears:      Comments: Bilateral TMs dull; nonbulging and nonperforated TMs bilaterally. No edema, erythema, exudates, cerumen to canals bilaterally.      Nose:      Comments: Bilateral inferior turbinates edematous and erythematous with moderate amounts of purulent drainage from nares. Bilateral maxillary sinus pressure to palpation   Eyes:      Extraocular Movements: Extraocular movements intact.      Conjunctiva/sclera: Conjunctivae normal.      Pupils: Pupils are equal, round, and reactive to light.   Cardiovascular:      Rate and Rhythm: Normal rate and regular rhythm.      Pulses: Normal pulses.      Heart sounds: Normal heart sounds.   Pulmonary:      Comments: No audible cough during physical examination. All lungfields clear to roomair per auscultation. Patient speaking in complete sentences without SOB or difficulty. Patient in no acute respiratory distress   Abdominal:      General: Abdomen is flat.      Palpations: Abdomen is soft.   Musculoskeletal:         General: Normal range of motion.      Cervical back: Neck supple.   Skin:     General: Skin is warm and dry.      Capillary Refill: Capillary refill takes less  than 2 seconds.   Neurological:      General: No focal deficit present.      Mental Status: He is oriented to person, place, and time.   Psychiatric:         Mood and Affect: Mood normal.         Behavior: Behavior normal.         Procedures    Point of Care Test & Imaging Results from this visit  No results found for this visit on 03/15/25.   No results found.    Diagnostic study results (if any) were reviewed by RAKESH Grey.    Assessment/Plan   Allergies, medications, history, and pertinent labs/EKGs/Imaging reviewed by RAKESH Grey.     Medical Decision Making  -Will treat suspected bacterial maxillary sinusitis with Augmentin  -OTC Flonase, decongestant as tolerated  -Follow-up with PCP as needed    At time of discharge, patient was clinically well-appearing and appropriate for outpatient management. The patient/parent/guardian was educated regarding diagnosis, supportive care, OTC and Rx medications. The patient/parent/guardian was given the opportunity to ask questions prior to discharge. They verbalized understanding of discussion of treatment plan, expected course of illness and/or injury, indications on when to return to , when to seek further evaluation in ED/call 911, and the need to follow up with PCP and/or specialist as referred. Patient/parent/guardian was provided with work/school documentation if requested. Patient stable upon discharge.     Orders and Diagnoses  Diagnoses and all orders for this visit:  Acute non-recurrent maxillary sinusitis  -     amoxicillin-pot clavulanate (Augmentin) 875-125 mg tablet; Take 1 tablet (875 mg) by mouth 2 times a day for 10 days. Take with food      Medical Admin Record      Patient disposition: Home    Electronically signed by RAKESH Grey  2:34 PM

## 2025-04-02 DIAGNOSIS — E78.5 HYPERLIPIDEMIA, UNSPECIFIED HYPERLIPIDEMIA TYPE: ICD-10-CM

## 2025-04-03 RX ORDER — ROSUVASTATIN CALCIUM 5 MG/1
5 TABLET, COATED ORAL NIGHTLY
Qty: 90 TABLET | Refills: 3 | Status: SHIPPED | OUTPATIENT
Start: 2025-04-03

## 2025-04-04 ENCOUNTER — OFFICE VISIT (OUTPATIENT)
Dept: URGENT CARE | Age: 50
End: 2025-04-04
Payer: OTHER GOVERNMENT

## 2025-04-04 VITALS
OXYGEN SATURATION: 94 % | SYSTOLIC BLOOD PRESSURE: 117 MMHG | HEART RATE: 79 BPM | DIASTOLIC BLOOD PRESSURE: 81 MMHG | WEIGHT: 191.2 LBS | TEMPERATURE: 97.8 F | RESPIRATION RATE: 16 BRPM | BODY MASS INDEX: 29.07 KG/M2

## 2025-04-04 DIAGNOSIS — H92.02 OTALGIA, LEFT: Primary | ICD-10-CM

## 2025-04-04 PROCEDURE — 99213 OFFICE O/P EST LOW 20 MIN: CPT

## 2025-04-04 ASSESSMENT — PAIN SCALES - GENERAL: PAINLEVEL_OUTOF10: 1

## 2025-04-04 NOTE — PROGRESS NOTES
"Subjective   Patient ID: Glen Reynolds \"John\" is a 49 y.o. male. They present today with a chief complaint of Earache (Left ear pain times 2 days. Recent ear infection 17 days ago.).    History of Present Illness  Patient is a 49-year-old male with history of hyperlipidemia, myalgia and recent sinus infection who presents urgent care today with a complaint of left ear pain.  He states his symptoms started 2 days ago after falling asleep with an earbud in his ear.  He denies any discharge, change in hearing, trauma, headaches, fevers or mastoid tenderness.  No other complaints or concerns mention at this time.      History provided by:  Patient  Earache         Past Medical History  Allergies as of 04/04/2025 - Reviewed 04/04/2025   Allergen Reaction Noted    Latex Unknown 06/09/2022       (Not in a hospital admission)         Past Medical History:   Diagnosis Date    Abnormal weight gain 07/29/2022    Weight gain    Acute laryngitis 11/17/2022    Laryngitis    Cough, unspecified 11/17/2022    Cough    Disorder of the skin and subcutaneous tissue, unspecified 02/27/2021    Benign skin lesion of multiple sites    Fever, unspecified 07/29/2022    Fever    Hyperlipidemia, unspecified 12/29/2022    Hyperlipidemia    Myalgia, unspecified site 07/29/2022    Myalgia    Nasal congestion 07/28/2022    Sinus congestion    Other seasonal allergic rhinitis     Seasonal allergies    Personal history of other diseases of the respiratory system 10/31/2019    History of acute bronchitis    Personal history of other mental and behavioral disorders     History of depression    Sneezing 04/05/2022    Sneezing       Past Surgical History:   Procedure Laterality Date    OTHER SURGICAL HISTORY  10/31/2019    No history of surgery        reports that he has never smoked. He has never used smokeless tobacco. He reports that he does not drink alcohol and does not use drugs.    Review of Systems  Review of Systems   HENT:  Positive for ear " pain.                                   Objective    Vitals:    04/04/25 1929   BP: 117/81   Pulse: 79   Resp: 16   Temp: 36.6 °C (97.8 °F)   SpO2: 94%   Weight: 86.7 kg (191 lb 3.2 oz)     No LMP for male patient.    Physical Exam  Vitals and nursing note reviewed.   Constitutional:       General: He is not in acute distress.     Appearance: Normal appearance. He is not ill-appearing, toxic-appearing or diaphoretic.   HENT:      Head: Normocephalic and atraumatic.      Right Ear: Tympanic membrane, ear canal and external ear normal.      Left Ear: Tympanic membrane, ear canal and external ear normal.      Mouth/Throat:      Mouth: Mucous membranes are moist.   Eyes:      Extraocular Movements: Extraocular movements intact.      Conjunctiva/sclera: Conjunctivae normal.      Pupils: Pupils are equal, round, and reactive to light.   Cardiovascular:      Rate and Rhythm: Normal rate and regular rhythm.      Pulses: Normal pulses.      Heart sounds: Normal heart sounds.   Pulmonary:      Effort: Pulmonary effort is normal. No respiratory distress.      Breath sounds: Normal breath sounds. No stridor. No wheezing, rhonchi or rales.   Chest:      Chest wall: No tenderness.   Musculoskeletal:         General: Normal range of motion.      Cervical back: Normal range of motion and neck supple.   Skin:     General: Skin is warm and dry.      Capillary Refill: Capillary refill takes less than 2 seconds.   Neurological:      General: No focal deficit present.      Mental Status: He is alert and oriented to person, place, and time.   Psychiatric:         Mood and Affect: Mood normal.         Behavior: Behavior normal.         Procedures      Assessment/Plan   Allergies, medications, history, and pertinent labs/EKGs/Imaging reviewed by RAKESH Chew.     Medical Decision Making    Patient is well appearing, afebrile, non toxic, not hypoxic, and appropriate for outpatient treatment and management at time of evaluation.  Patient presents with left ear pain x 2 days.     Differential includes but not limited to: Otitis media, otitis externa, temporomandibular joint dysfunction, other    On exam, TMs are noted to be intact bilaterally and without signs of infection.  No mastoid tenderness.  External auditory canals normal bilaterally.  Patient does have mild tenderness with palpation of the left TMJ.  He notes he has been under a lot of stress lately and teeth.  Recommended over-the-counter anti-inflammatory such as ibuprofen or Aleve as well as ice and possible over-the-counter mouthguard.    Counseling: Spoke with the patient and discussed today´s findings, in addition to providing specific details for the plan of care and expected course.  Patient was given the opportunity to ask questions.     Discussed return precautions and importance of follow-up. Advised to follow-up with PCP and possibly his dentist. I specifically advised to go to the ED for changing or worsening symptoms, new symptoms, complaint specific precautions, and precautions listed on the discharge paperwork.    I advised the patient that the urgent care evaluation and treatment provided today doesn't end their need for medical care. It is very important that they follow-up with their primary care provider or other specialist as instructed.     Dictation software was used in the creation of this note which does not evaluate or correct for typographical, spelling, syntax or grammatical errors.    Orders and Diagnoses  Diagnoses and all orders for this visit:  Otalgia, left      Medical Admin Record      Follow Up Instructions  No follow-ups on file.    Patient disposition: Home    Electronically signed by RAKESH Chew  7:54 PM

## 2025-04-04 NOTE — PATIENT INSTRUCTIONS
You were seen at Urgent Care today for left ear pain. Please treat as discussed.  Monitor for red flags which we spoke about, If your symptoms change, worsen or become concerning in any way, please go to the emergency room immediately, otherwise you can followup with your PCP in 2-3 days as needed

## 2025-04-08 ENCOUNTER — OFFICE VISIT (OUTPATIENT)
Dept: PRIMARY CARE | Facility: CLINIC | Age: 50
End: 2025-04-08
Payer: OTHER GOVERNMENT

## 2025-04-08 VITALS
WEIGHT: 195 LBS | DIASTOLIC BLOOD PRESSURE: 73 MMHG | BODY MASS INDEX: 29.55 KG/M2 | HEIGHT: 68 IN | SYSTOLIC BLOOD PRESSURE: 118 MMHG

## 2025-04-08 DIAGNOSIS — H92.02 LEFT EAR PAIN: Primary | ICD-10-CM

## 2025-04-08 DIAGNOSIS — R09.81 SINUS CONGESTION: ICD-10-CM

## 2025-04-08 PROCEDURE — 99213 OFFICE O/P EST LOW 20 MIN: CPT | Performed by: INTERNAL MEDICINE

## 2025-04-08 PROCEDURE — 3008F BODY MASS INDEX DOCD: CPT | Performed by: INTERNAL MEDICINE

## 2025-04-08 RX ORDER — AZELASTINE 1 MG/ML
1 SPRAY, METERED NASAL 2 TIMES DAILY
Qty: 30 ML | Refills: 12 | Status: SHIPPED | OUTPATIENT
Start: 2025-04-08 | End: 2026-04-08

## 2025-04-08 RX ORDER — PREDNISONE 20 MG/1
40 TABLET ORAL DAILY
Qty: 6 TABLET | Refills: 0 | Status: SHIPPED | OUTPATIENT
Start: 2025-04-08 | End: 2025-04-11

## 2025-04-08 RX ORDER — DOXYCYCLINE 100 MG/1
100 CAPSULE ORAL 2 TIMES DAILY
Qty: 14 CAPSULE | Refills: 0 | Status: SHIPPED | OUTPATIENT
Start: 2025-04-08 | End: 2025-04-18

## 2025-04-08 NOTE — PROGRESS NOTES
"Subjective   Patient ID: Glen Reynolds \"John\" is a 49 y.o. male who presents for Earache.    HPI  Patient in for a visit  The week before finished antibiotic s for a sinus infection  The night before last week fell asleep with an earbud in his ear for three hours   Tearing more on the right eye   End of his upper right molar tingling hearing is weird    In contact with his mom a week ago Friday ER visit tested neg for covid the Monday tested + covid and shingles   No fever head feels warm but no fever   Not aware that he is grinding his teeth had a lot of dental work in his youth     Review of Systems  General: see hpi  Ears, Nose, Throat : see hpi  Dermatologic: Negative for new skin conditions, rash  Respiratory: see hpi  Cardiovascular: Negative for chest pain, palpitations, or leg swelling  Gastrointestinal: Negative for nausea/vomiting, abdominal pain, changes in bowel habits  Neurological: Negative for dizziness see hpi headaches    Previous history  Past Medical History:   Diagnosis Date    Abnormal weight gain 07/29/2022    Weight gain    Acute laryngitis 11/17/2022    Laryngitis    Cough, unspecified 11/17/2022    Cough    Disorder of the skin and subcutaneous tissue, unspecified 02/27/2021    Benign skin lesion of multiple sites    Fever, unspecified 07/29/2022    Fever    Hyperlipidemia, unspecified 12/29/2022    Hyperlipidemia    Myalgia, unspecified site 07/29/2022    Myalgia    Nasal congestion 07/28/2022    Sinus congestion    Other seasonal allergic rhinitis     Seasonal allergies    Personal history of other diseases of the respiratory system 10/31/2019    History of acute bronchitis    Personal history of other mental and behavioral disorders     History of depression    Sneezing 04/05/2022    Sneezing     Past Surgical History:   Procedure Laterality Date    OTHER SURGICAL HISTORY  10/31/2019    No history of surgery     Social History     Tobacco Use    Smoking status: Never    Smokeless tobacco: " "Never    Tobacco comments:     Always hated the smell of the stuff   Vaping Use    Vaping status: Never Used   Substance Use Topics    Alcohol use: Never    Drug use: Never     Family History   Problem Relation Name Age of Onset    Diabetes Mother      Depression Mother          major    COPD Father      Diabetes Father      Other (spinal stenosis) Father       Allergies   Allergen Reactions    Latex Unknown     Current Outpatient Medications   Medication Instructions    fexofenadine (Allegra) 180 mg tablet 1 tablet, Daily    fluticasone (Flonase) 50 mcg/actuation nasal spray 2 sprays, Each Nostril, Daily    peg-sod sul-NaCl-KCl-asb-C (MoviPrep) 100-7.5-2.691 gram solution Schedule the initial dose the evening before colonoscopy, followed by a second dose the morning of procedure (2 day regimen) or ~ 90 minutes after starting dose (1 day regimen).    rosuvastatin (CRESTOR) 5 mg, oral, Nightly       Objective       Physical Exam  Vital Signs: as recorded above  General: Well groomed, well nourished   Orientation:  Alert , oriented to time, place , and person   Mood and Affect:  Cooperative , no apparent distress normal affect  Skin: Good color, good turgor  Eyes: Extra ocular muscle movements intact, anicteric sclerae  Ears: Tms intact dull R  > L  , clear auditory canals   no cerumen  No lesions,   No sinus tenderness , mild tenderness preauricular area left side jaw itself nontender   Neck: Supple, full range of movement mild lymph node swelling nontender left submandibular above mid SCM distribution  Chest: Normal breath sounds, normal chest wall exam, symmetric, good air entry, clear to auscultation  Extremities: full range of movement  bilateral UE and bilateral LE,   Neurological: Alert, oriented, cranial nerves II-XII grossly intact except for visual acuity  Gait: normal steady      Assessment/Plan   Glen grayson \"John\" is a 49 y.o. male who presents for the concerns below:    Problem List Items Addressed " This Visit    None    OTALGIA , TMJ PAIN -may be jaw clenching  may also be residual sinus infeciton since there is tenderness preuaricular zygo area  Add another course 10 days doxy help resolve sinus , possible dental infection   Supportive care, plenty of fluids, Tylenol  will add 3 days of prednisone to take  with food hold off on the use prescribed steroid nasal spray, and switch to astepro Live culture yogurt or probiotics to help regrow good bacteria  frequent handwashing, sanitize surrounding objects, change toothbrush If any problems arise patient to call or come back in.   If not better should see dentist may need dental xrays , he will call to make an appt for next week in case our treatment fails     Wife is doing well, not getting sick , knows he is gaining     Return in :  As scheduled for cpe   Portions of this note were generated using digital voice recognition software, and may contain grammatical errors       Miya Callaway MD  04/08/25  3:44 PM

## 2025-04-16 DIAGNOSIS — G47.30 SLEEP APNEA, UNSPECIFIED TYPE: Primary | ICD-10-CM

## 2025-04-18 ENCOUNTER — OFFICE VISIT (OUTPATIENT)
Dept: PRIMARY CARE | Facility: CLINIC | Age: 50
End: 2025-04-18
Payer: OTHER GOVERNMENT

## 2025-04-18 VITALS
BODY MASS INDEX: 29.25 KG/M2 | WEIGHT: 193 LBS | TEMPERATURE: 98.5 F | SYSTOLIC BLOOD PRESSURE: 126 MMHG | HEIGHT: 68 IN | DIASTOLIC BLOOD PRESSURE: 83 MMHG

## 2025-04-18 DIAGNOSIS — R09.81 SINUS CONGESTION: Primary | ICD-10-CM

## 2025-04-18 PROCEDURE — 99214 OFFICE O/P EST MOD 30 MIN: CPT | Performed by: INTERNAL MEDICINE

## 2025-04-18 PROCEDURE — 3008F BODY MASS INDEX DOCD: CPT | Performed by: INTERNAL MEDICINE

## 2025-04-18 RX ORDER — SULFAMETHOXAZOLE AND TRIMETHOPRIM 800; 160 MG/1; MG/1
1 TABLET ORAL 2 TIMES DAILY
Qty: 20 TABLET | Refills: 0 | Status: SHIPPED | OUTPATIENT
Start: 2025-04-18 | End: 2025-04-28

## 2025-04-18 NOTE — PROGRESS NOTES
"Subjective   Patient ID: Glen Reynolds \"John\" is a 49 y.o. male who presents for Sinus Problem (Rash mom has shingles ,).    HPI  Patient in for a visit  Went to dentist told tmj clenching  Still with sinus infection  Rash on his stomach     Review of Systems  General: Denies fever, chills, night sweats,  Eyes: Negative for recent visual changes  Ears, Nose, Throat :  Negative for hearing changes, sinus discomfort  Dermatologic:see hpi   Respiratory: Negative for wheezing, shortness of breath, cough  Cardiovascular: Negative for chest pain, palpitations, or leg swelling  Gastrointestinal: Negative for nausea/vomiting, abdominal pain, changes in bowel habits  Genitourinary Negative for Urinary Incontinence  urgency , frequency, discomfort   Musculoskeletal: see hpi  Neurological: Negative for headaches, dizziness    Previous history  Medical History[1]  Surgical History[2]  Social History[3]  Family History[4]  Allergies[5]  Current Outpatient Medications   Medication Instructions   • azelastine (Astelin) 137 mcg (0.1 %) nasal spray 1 spray, Each Nostril, 2 times daily, Use in each nostril as directed   • doxycycline (VIBRAMYCIN) 100 mg, oral, 2 times daily, Take with at least 8 ounces (large glass) of water, do not lie down for 30 minutes after   • fexofenadine (Allegra) 180 mg tablet 1 tablet, Daily   • fluticasone (Flonase) 50 mcg/actuation nasal spray 2 sprays, Each Nostril, Daily   • peg-sod sul-NaCl-KCl-asb-C (MoviPrep) 100-7.5-2.691 gram solution Schedule the initial dose the evening before colonoscopy, followed by a second dose the morning of procedure (2 day regimen) or ~ 90 minutes after starting dose (1 day regimen).   • rosuvastatin (CRESTOR) 5 mg, oral, Nightly       Objective       Physical Exam      Assessment/Plan   Glen Reynolds \"John\" is a 49 y.o. male who presents for the concerns below:    Problem List Items Addressed This Visit    None           Discussed with:   Return in :    Portions of this " note were generated using digital voice recognition software, and may contain grammatical errors       Miya Callaway MD  04/18/25  1:43 PM         [1]  Past Medical History:  Diagnosis Date   • Abnormal weight gain 07/29/2022    Weight gain   • Acute laryngitis 11/17/2022    Laryngitis   • Cough, unspecified 11/17/2022    Cough   • Disorder of the skin and subcutaneous tissue, unspecified 02/27/2021    Benign skin lesion of multiple sites   • Fever, unspecified 07/29/2022    Fever   • Hyperlipidemia, unspecified 12/29/2022    Hyperlipidemia   • Myalgia, unspecified site 07/29/2022    Myalgia   • Nasal congestion 07/28/2022    Sinus congestion   • Other seasonal allergic rhinitis     Seasonal allergies   • Personal history of other diseases of the respiratory system 10/31/2019    History of acute bronchitis   • Personal history of other mental and behavioral disorders     History of depression   • Sneezing 04/05/2022    Sneezing   [2]  Past Surgical History:  Procedure Laterality Date   • OTHER SURGICAL HISTORY  10/31/2019    No history of surgery   [3]  Social History  Tobacco Use   • Smoking status: Never   • Smokeless tobacco: Never   • Tobacco comments:     Always hated the smell of the stuff   Vaping Use   • Vaping status: Never Used   Substance Use Topics   • Alcohol use: Never   • Drug use: Never   [4]  Family History  Problem Relation Name Age of Onset   • Diabetes Mother     • Depression Mother          major   • COPD Father     • Diabetes Father     • Other (spinal stenosis) Father     [5]  Allergies  Allergen Reactions   • Latex Unknown

## 2025-05-12 ENCOUNTER — APPOINTMENT (OUTPATIENT)
Dept: OTOLARYNGOLOGY | Facility: CLINIC | Age: 50
End: 2025-05-12
Payer: OTHER GOVERNMENT

## 2025-05-12 ENCOUNTER — OFFICE VISIT (OUTPATIENT)
Dept: DERMATOLOGY | Facility: CLINIC | Age: 50
End: 2025-05-12
Payer: OTHER GOVERNMENT

## 2025-05-12 ENCOUNTER — APPOINTMENT (OUTPATIENT)
Dept: DERMATOLOGY | Facility: CLINIC | Age: 50
End: 2025-05-12
Payer: OTHER GOVERNMENT

## 2025-05-12 DIAGNOSIS — L90.5 SCAR CONDITIONS AND FIBROSIS OF SKIN: Primary | ICD-10-CM

## 2025-05-12 DIAGNOSIS — Q82.5 CONGENITAL NON-NEOPLASTIC NEVUS: ICD-10-CM

## 2025-05-12 DIAGNOSIS — D18.01 CHERRY ANGIOMA: ICD-10-CM

## 2025-05-12 DIAGNOSIS — D22.9 MULTIPLE BENIGN NEVI: ICD-10-CM

## 2025-05-12 DIAGNOSIS — Z12.83 SKIN CANCER SCREENING: ICD-10-CM

## 2025-05-12 PROCEDURE — 99203 OFFICE O/P NEW LOW 30 MIN: CPT | Performed by: STUDENT IN AN ORGANIZED HEALTH CARE EDUCATION/TRAINING PROGRAM

## 2025-05-12 ASSESSMENT — DERMATOLOGY PATIENT ASSESSMENT
WHERE ARE THESE NEW OR CHANGING LESIONS LOCATED: NECK
DO YOU HAVE ANY NEW OR CHANGING LESIONS: YES
HAVE YOU HAD OR DO YOU HAVE A STAPH INFECTION: NO
ARE YOU AN ORGAN TRANSPLANT RECIPIENT: NO
DO YOU USE A TANNING BED: NO
DO YOU USE SUNSCREEN: OCCASIONALLY
HAVE YOU HAD OR DO YOU HAVE VASCULAR DISEASE: NO

## 2025-05-12 ASSESSMENT — DERMATOLOGY QUALITY OF LIFE (QOL) ASSESSMENT
RATE HOW BOTHERED YOU ARE BY SYMPTOMS OF YOUR SKIN PROBLEM (EG, ITCHING, STINGING BURNING, HURTING OR SKIN IRRITATION): 0 - NEVER BOTHERED
RATE HOW BOTHERED YOU ARE BY EFFECTS OF YOUR SKIN PROBLEMS ON YOUR ACTIVITIES (EG, GOING OUT, ACCOMPLISHING WHAT YOU WANT, WORK ACTIVITIES OR YOUR RELATIONSHIPS WITH OTHERS): 0 - NEVER BOTHERED
ARE THERE EXCLUSIONS OR EXCEPTIONS FOR THE QUALITY OF LIFE ASSESSMENT: NO
DATE THE QUALITY-OF-LIFE ASSESSMENT WAS COMPLETED: 67337
WHAT SINGLE SKIN CONDITION LISTED BELOW IS THE PATIENT ANSWERING THE QUALITY-OF-LIFE ASSESSMENT QUESTIONS ABOUT: NONE OF THE ABOVE
RATE HOW EMOTIONALLY BOTHERED YOU ARE BY YOUR SKIN PROBLEM (FOR EXAMPLE, WORRY, EMBARRASSMENT, FRUSTRATION): 0 - NEVER BOTHERED

## 2025-05-12 ASSESSMENT — PATIENT GLOBAL ASSESSMENT (PGA): PATIENT GLOBAL ASSESSMENT: PATIENT GLOBAL ASSESSMENT:  1 - CLEAR

## 2025-05-12 NOTE — PROGRESS NOTES
"Wayne Reynolds \"John\" is a 49 y.o. male who presents for the following: Skin Check (FBSE, area of concern on neck, family HX of skin cancer).     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 Has a mother's sister who had unknown type of skin cancer. Has 2 daughters, a son on the way. No personal history of skin cancer.    Intake Questions  Do you have any new or changing Lesions?: Yes  Where are these new or changing lesion(s) located?: neck  Are you an organ transplant recipient?: No  Have you had or do you have a Staph Infection?: No  Have you had or do you have Vacular Disease?: No  Do you use sunscreen?: Occasionally  Do you use a tanning bed?: No    Review of Systems:  No other skin or systemic complaints other than what is documented elsewhere in the note.    The following portions of the chart were reviewed this encounter and updated as appropriate:         Skin Cancer History  Biopsy Log Book  No skin cancers from Specimen Tracking.    Additional History      Specialty Problems          Dermatology Problems    Keloid    Melanocytic nevi of left upper limb, including shoulder    Melanocytic nevi of trunk    Other benign neoplasm of skin of other parts of face    Other benign neoplasm of skin of right lower limb, including hip    Benign skin lesion of multiple sites        Objective   Well appearing patient in no apparent distress; mood and affect are within normal limits.    A full examination was performed including scalp, head, eyes, ears, nose, lips, neck, chest, axillae, abdomen, back, buttocks, bilateral upper extremities, bilateral lower extremities, hands, feet, fingers, toes, fingernails, and toenails. All findings within normal limits unless otherwise noted below.    Assessment/Plan   Skin Exam  1. SCAR CONDITIONS AND FIBROSIS OF SKIN  Neck - Anterior  Scar at site of past lesion biopsy which turned out to be benign ([facility])  This is a benign finding and requires no " treatment.    2. BLANCHARD ANGIOMA  Generalized  Cherry red papules    This is a benign finding and requires no treatment.    3. MULTIPLE BENIGN NEVI  Generalized  All nevi were symmetric brown macules without atypia on dermoscopy.     This is a benign finding and requires no treatment.    4. CONGENITAL NON-NEOPLASTIC NEVUS  Neck - Posterior  Skin colored papule with terminal hairs emanating  This is a benign finding and requires no treatment.    5. SKIN CANCER SCREENING    - Sun protective behavior reviewed and encouraged including the use of over-the-counter broad spectrum sunscreen with SPF30+ daily (reapply every 1-1.5 hours when outdoors), UPF clothing, broad rimmed hats, sunglasses, and avoidance of midday sun. Home skin monitoring encouraged and how to monitor for skin cancer (changing or new moles, new rapidly growing or non-healing lesions) reviewed.       F/u in 2yrs for fbse   Pt seen by Dr. Stephanie De La Rosa, PGY-2    I was present during all key portions of visit including history, exam, discussion/plan and/or procedures and directly supervised our resident during all portions of the visit, follow up care, medications and more    Js Amaya MD

## 2025-05-29 ENCOUNTER — APPOINTMENT (OUTPATIENT)
Dept: OTOLARYNGOLOGY | Facility: CLINIC | Age: 50
End: 2025-05-29
Payer: OTHER GOVERNMENT

## 2025-06-19 ENCOUNTER — APPOINTMENT (OUTPATIENT)
Dept: OTOLARYNGOLOGY | Facility: CLINIC | Age: 50
End: 2025-06-19
Payer: OTHER GOVERNMENT

## 2025-06-19 VITALS — BODY MASS INDEX: 29.5 KG/M2 | WEIGHT: 194 LBS

## 2025-06-19 DIAGNOSIS — R09.81 SINUS CONGESTION: ICD-10-CM

## 2025-06-19 DIAGNOSIS — J31.0 CHRONIC RHINITIS: ICD-10-CM

## 2025-06-19 DIAGNOSIS — J32.9 CHRONIC SINUSITIS, UNSPECIFIED LOCATION: Primary | ICD-10-CM

## 2025-06-19 DIAGNOSIS — J34.8212 EXTERNAL NASAL VALVE COLLAPSE, DYNAMIC: ICD-10-CM

## 2025-06-19 DIAGNOSIS — G47.33 OSA ON CPAP: ICD-10-CM

## 2025-06-19 PROCEDURE — 99204 OFFICE O/P NEW MOD 45 MIN: CPT | Performed by: GENERAL PRACTICE

## 2025-06-19 RX ORDER — TRIAMCINOLONE ACETONIDE 55 UG/1
2 SPRAY, METERED NASAL DAILY
Qty: 16.5 G | Refills: 3 | Status: SHIPPED | OUTPATIENT
Start: 2025-06-19 | End: 2026-06-19

## 2025-06-19 RX ORDER — AZELASTINE 1 MG/ML
2 SPRAY, METERED NASAL 2 TIMES DAILY
Qty: 30 ML | Refills: 3 | Status: SHIPPED | OUTPATIENT
Start: 2025-06-19 | End: 2026-06-19

## 2025-06-19 ASSESSMENT — PATIENT HEALTH QUESTIONNAIRE - PHQ9
1. LITTLE INTEREST OR PLEASURE IN DOING THINGS: NOT AT ALL
SUM OF ALL RESPONSES TO PHQ9 QUESTIONS 1 AND 2: 0
2. FEELING DOWN, DEPRESSED OR HOPELESS: NOT AT ALL

## 2025-06-19 NOTE — PROGRESS NOTES
"Otolaryngology - Head and Neck Surgery Outpatient New Patient Visit Note        Assessment/Plan:   Problem List Items Addressed This Visit    None  Visit Diagnoses         Codes      Chronic sinusitis, unspecified location    -  Primary J32.9    Relevant Medications    triamcinolone (Nasacort) 55 mcg nasal inhaler    azelastine (Astelin) 137 mcg (0.1 %) nasal spray    Other Relevant Orders    CT sinus wo IV contrast      Sinus congestion     R09.81      Chronic rhinitis     J31.0    Relevant Medications    triamcinolone (Nasacort) 55 mcg nasal inhaler    azelastine (Astelin) 137 mcg (0.1 %) nasal spray      JENSEN on CPAP     G47.33      External nasal valve collapse, dynamic     J34.8212            49yoM with chronic nasal obstruction contributing to JENSEN.  Limited CPAP tolerance due ot nasal obstruction.    Chronic rhinitis, recurrent sinusitis, and external nasal valve collapse contributing to nasal obstruction/  Discussed controls for rhinitis  Will acquire CT sinus to aid in evaluation of underlying pathology, given recurrent sinusitis requiring multiple rounds of antibiotics and ongoing sinus pressure and congestion despite regular use of flonase for months          Follow up:  -plan for follow up in clinic as needed, after completion of ordered studies, and in 1-2 months    All of the above findings, impressions, treatment planning and follow up plans were discussed with the patient who indicated understanding.  the patient was instructed to contact or return to clinic sooner if symptoms/signs persist or worsen despite the above management.      Amadeo Chen MD  Otolaryngology - Head and Neck Surgery            History Of Present Illness  Glen Reynolds \"John\" is a 49 y.o. male presenting for evaluation of chronic nasal obstruction/congestion and recurrent sinusitis.    Reports 3 episdoes of sinusitis in the last 6mo, requiring multiple rounds of antibiotics.  Reports ongoing congestion, worse at night, which " interferes with use of CPAP.     Reports a long history of allergic rhinitis, with limited relief with use of flonase and antihistamines.     Reports no prior nasal trauma or surgery.     Notes use of CPAP with face mask, but limited tolerance due to nasal obstruction.                 Past Medical History  He has a past medical history of Abnormal weight gain (07/29/2022), Acute laryngitis (11/17/2022), Cough, unspecified (11/17/2022), Disorder of the skin and subcutaneous tissue, unspecified (02/27/2021), Fever, unspecified (07/29/2022), Hyperlipidemia, unspecified (12/29/2022), Myalgia, unspecified site (07/29/2022), Nasal congestion (07/28/2022), Other seasonal allergic rhinitis, Personal history of other diseases of the respiratory system (10/31/2019), Personal history of other mental and behavioral disorders, and Sneezing (04/05/2022).    Surgical History  He has a past surgical history that includes Other surgical history (10/31/2019).     Social History  He reports that he has never smoked. He has never used smokeless tobacco. He reports that he does not drink alcohol and does not use drugs.    Family History  Family History[1]     Allergies  Latex    Review of Systems  ROS: Pertinent positives as noted in HPI.    - CONSTITUTIONAL: Does not report weight loss, fever or chills.    - HEENT:   Ear: Does not report tinnitus, vertigo, hearing loss, otalgia, otorrhea  Nose: Does not report  ,  , epistaxis, decreased smell  Throat: Does not report pain, dysphagia, odynophagia  Larynx: Does not report hoarseness,  difficulty breathing, pain with speaking (odynophonia)  Neck: Does not report new masses, pain, swelling  Face: Does not report    ,  , swelling, numbness, weakness     - RESPIRATORY: Does not report SOB or cough.    - CV: Does not report palpitations or chest pain.     - GI: Does not report abdominal pain, nausea, vomiting or diarrhea.    - : Does not report dysuria or urinary frequency.    - MSK: Does  not report myalgia or joint pain.    - SKIN: Does not report rash or pruritus.    - NEUROLOGICAL: Does not report headache or syncope.    - PSYCHIATRIC: Does not report recent changes in mood. Does not report anxiety or depression.         Physical Exam:     GENERAL:   Alert & Oriented to person, place and time; Normal affect and appearance. Well developed and well nourished. Conversant & cooperative with examination.     HEAD:   Normocephalic, atraumatic. No sinus tenderness to palpation. Normal parotid bilaterally. Normal facial strength.     NEUROLOGIC:   Cranial nerves II-XII grossly intact, gait WNL. Normal mood and affect.    EYES:   Extraocular movements intact. Pupils equal, round, reactive to light and accommodation. No nystagmus, no ptosis. no scleral injection.    EAR:   Normal auricle. No discomfort or TTP with manipulation.   Handheld otoscopic exam showed normal external auditory canals bilaterally. No purulence or EAC inflammation. Minimal cerumen.   Right tympanic membrane clear and mobile without evidence of perforation, retraction or middle ear effusion.   Left tympanic membrane clear and mobile without evidence of perforation, retraction or middle ear effusion.     NOSE:   No external deformity. No external nasal lesions, lacerations, or scars. Nasal tip symmetrical with R>L dynamic collapse of external nasal valves.   Nasal cavity with deviated   septum, edematous  mucosa and turbinates. No lesions, masses, purulence or polyps.     OC/OP:   Mucous membranes moist, no masses, lesions or exudates.   Normal tongue, floor of mouth, teeth, gums, lips. Normal posterior pharyngeal wall.    Normal tonsils without erythema, exudate or obvious calculi     NECK:   No neck masses or thyroid enlargement. Trachea midline. No tenderness to palpation    LYMPHATIC:   No cervical lymphadenopathy.     RESPIRATORY:   Symmetric chest elevation & no retractions. No significant hoarseness. No increased work of  breathing.    CV:   No clubbing or cyanosis. No obvious edema    Skin:   No facial rashes, vesicles or lesions.     Extremities:   No gross abnormalities      Clinic Procedure        Information review:  External sources (notes, imaging, lab results) listed below personally reviewed to aid in medical decision making process.  -[facility] 4/18/25  -[facility] 4/8/25  - 4/4/25         [1]   Family History  Problem Relation Name Age of Onset    Diabetes Mother      Depression Mother          major    COPD Father      Diabetes Father      Other (spinal stenosis) Father

## 2025-06-24 ENCOUNTER — APPOINTMENT (OUTPATIENT)
Dept: GASTROENTEROLOGY | Facility: HOSPITAL | Age: 50
End: 2025-06-24
Payer: OTHER GOVERNMENT

## 2025-06-26 ENCOUNTER — HOSPITAL ENCOUNTER (OUTPATIENT)
Dept: GASTROENTEROLOGY | Facility: HOSPITAL | Age: 50
Discharge: HOME | End: 2025-06-26
Payer: OTHER GOVERNMENT

## 2025-06-26 VITALS
BODY MASS INDEX: 29.4 KG/M2 | WEIGHT: 194 LBS | HEIGHT: 68 IN | HEART RATE: 55 BPM | TEMPERATURE: 97.5 F | SYSTOLIC BLOOD PRESSURE: 104 MMHG | OXYGEN SATURATION: 95 % | DIASTOLIC BLOOD PRESSURE: 64 MMHG | RESPIRATION RATE: 12 BRPM

## 2025-06-26 DIAGNOSIS — Z12.11 SCREENING FOR MALIGNANT NEOPLASM OF COLON: Primary | ICD-10-CM

## 2025-06-26 PROCEDURE — 3700000013 HC SEDATION LEVEL 5+ TIME - EACH ADDITIONAL 15 MINUTES

## 2025-06-26 PROCEDURE — 7100000009 HC PHASE TWO TIME - INITIAL BASE CHARGE

## 2025-06-26 PROCEDURE — 2500000004 HC RX 250 GENERAL PHARMACY W/ HCPCS (ALT 636 FOR OP/ED): Performed by: INTERNAL MEDICINE

## 2025-06-26 PROCEDURE — 7100000010 HC PHASE TWO TIME - EACH INCREMENTAL 1 MINUTE

## 2025-06-26 PROCEDURE — 45385 COLONOSCOPY W/LESION REMOVAL: CPT | Performed by: INTERNAL MEDICINE

## 2025-06-26 PROCEDURE — 3700000012 HC SEDATION LEVEL 5+ TIME - INITIAL 15 MINUTES 5/> YEARS

## 2025-06-26 RX ORDER — FENTANYL CITRATE 50 UG/ML
INJECTION, SOLUTION INTRAMUSCULAR; INTRAVENOUS AS NEEDED
Status: COMPLETED | OUTPATIENT
Start: 2025-06-26 | End: 2025-06-26

## 2025-06-26 RX ORDER — MIDAZOLAM HYDROCHLORIDE 1 MG/ML
INJECTION, SOLUTION INTRAMUSCULAR; INTRAVENOUS AS NEEDED
Status: COMPLETED | OUTPATIENT
Start: 2025-06-26 | End: 2025-06-26

## 2025-06-26 RX ADMIN — MIDAZOLAM HYDROCHLORIDE 2 MG: 1 INJECTION, SOLUTION INTRAMUSCULAR; INTRAVENOUS at 08:39

## 2025-06-26 RX ADMIN — FENTANYL CITRATE 25 MCG: 50 INJECTION, SOLUTION INTRAMUSCULAR; INTRAVENOUS at 08:42

## 2025-06-26 RX ADMIN — MIDAZOLAM HYDROCHLORIDE 1 MG: 1 INJECTION, SOLUTION INTRAMUSCULAR; INTRAVENOUS at 08:42

## 2025-06-26 RX ADMIN — FENTANYL CITRATE 50 MCG: 50 INJECTION, SOLUTION INTRAMUSCULAR; INTRAVENOUS at 08:39

## 2025-06-26 ASSESSMENT — PAIN - FUNCTIONAL ASSESSMENT
PAIN_FUNCTIONAL_ASSESSMENT: 0-10

## 2025-06-26 ASSESSMENT — COLUMBIA-SUICIDE SEVERITY RATING SCALE - C-SSRS
2. HAVE YOU ACTUALLY HAD ANY THOUGHTS OF KILLING YOURSELF?: NO
1. IN THE PAST MONTH, HAVE YOU WISHED YOU WERE DEAD OR WISHED YOU COULD GO TO SLEEP AND NOT WAKE UP?: NO
6. HAVE YOU EVER DONE ANYTHING, STARTED TO DO ANYTHING, OR PREPARED TO DO ANYTHING TO END YOUR LIFE?: NO

## 2025-06-26 ASSESSMENT — PAIN SCALES - GENERAL

## 2025-06-26 NOTE — DISCHARGE INSTRUCTIONS
Patient Instructions after a Colonoscopy      The anesthetics, sedatives or narcotics which were given to you today will be acting in your body for the next 24 hours, so you might feel a little sleepy or groggy.  This feeling should slowly wear off. Carefully read and follow the instructions.     You received sedation today:  - Do not drive or operate any machinery or power tools of any kind.   - No alcoholic beverages today, not even beer or wine.  - Do not make any important decisions or sign any legal documents.  - No over the counter medications that contain alcohol or that may cause drowsiness.  - Do not make any important decisions or sign any legal documents.  - Make sure you have someone with you for first 24 hours.    While it is common to experience mild to moderate abdominal distention, gas, or belching after your procedure, if any of these symptoms occur following discharge from the GI Lab or within one week of having your procedure, call the [facility] to be advised whether a visit to your nearest Urgent Care or Emergency Department is indicated.  Take this paper with you if you go.     - If you develop an allergic reaction to the medications that were given during your procedure such as difficulty breathing, rash, hives, severe nausea, vomiting or lightheadedness.  - If you experience chest pain, shortness of breath, severe abdominal pain, fevers and chills.  -If you develop signs and symptoms of bleeding such as blood in your spit, if your stools turn black, tarry, or bloody  - If you have not urinated within 8 hours following your procedure.  - If your IV site becomes painful, red, inflamed, or looks infected.    If you received a biopsy/polypectomy/sphincterotomy the following instructions apply below:    __ Do not use Aspirin containing products, non-steroidal medications or anti-coagulants for one week following your procedure. (Examples of these types of medications are: Advil,  Arthrotec, Aleve, Coumadin, Ecotrin, Heparin, Ibuprofen, Indocin, Motrin, Naprosyn, Nuprin, Plavix, Vioxx, and Voltarin, or their generic forms.  This list is not all-inclusive.  Check with your physician or pharmacist before resuming medications.)   __ Eat a soft diet today.  Avoid foods that are poorly digested for the next 24 hours.  These foods would include: nuts, beans, lettuce, red meats, and fried foods. Start with liquids and advance your diet as tolerated, gradually work up to eating solids.   __ Do not have a Barium Study or Enema for one week.    Your physician recommends the additional following instructions:    -You have a contact number available for emergencies. The signs and symptoms of potential delayed complications were discussed with you. You may return to normal activities tomorrow.  -Resume your previous diet.  -Continue your present medications.   -We are waiting for your pathology results.  -Your physician has recommended a repeat colonoscopy (date to be determined after pending pathology results are reviewed) for surveillance based on pathology results.  -The findings and recommendations have been discussed with you.  -The findings and recommendations were discussed with your family.  - Please see Medication Reconciliation Form for new medication/medications prescribed.       If you experience any problems or have any questions following discharge from the GI Lab, please call:    Adolfo Pérez MD  [phone]    To reach your physician after hours call [phone] and ask for the GI Fellow on call      Nurse Signature                                                                        Date___________________                                                                            Patient/Responsible Party Signature                                        Date___________________

## 2025-06-26 NOTE — PERIOPERATIVE NURSING NOTE
0911 Arrival to post endo. Awake and alert. No complaints. Dr. Préez in to speak to pt.     0939 Discharge instructions reviewed.     0950 PIV removed and pt getting dressed.     0959 Discharged to home with friend.

## 2025-06-26 NOTE — H&P
"History Of Present Illness  Glen Reynolds \"John\" is a 49 y.o. male presenting with screening.     Past Medical History  Medical History[1]  Surgical History  Surgical History[2]  Social History  He reports that he has never smoked. He has never used smokeless tobacco. He reports that he does not drink alcohol and does not use drugs.    Family History  Family History[3]     Allergies  Allergies[4]  Review of Systems  Pre-sedation Evaluation:  ASA Classification - ASA 1 - Normal health patient  Mallampati Score - II (hard and soft palate, upper portion of tonsils and uvula visible)    Physical Exam  Vitals reviewed.   Constitutional:       Appearance: Normal appearance.   Cardiovascular:      Rate and Rhythm: Normal rate and regular rhythm.   Pulmonary:      Effort: Pulmonary effort is normal.      Breath sounds: Normal breath sounds.   Neurological:      Mental Status: He is alert.          Last Recorded Vitals  Blood pressure 128/72, pulse 82, temperature 36.2 °C (97.2 °F), temperature source Temporal, resp. rate 18, height 1.727 m (5' 8\"), weight 88 kg (194 lb 0.1 oz), SpO2 96%.     Assessment/Plan   R/O neoplasm for colonoscopy     PTA/Current Medications:  Prescriptions Prior to Admission[5]  Current Medications[6]  Adolfo Pérez MD       [1]   Past Medical History:  Diagnosis Date    Abnormal weight gain 07/29/2022    Weight gain    Acute laryngitis 11/17/2022    Laryngitis    Cough, unspecified 11/17/2022    Cough    Depression     Disorder of the skin and subcutaneous tissue, unspecified 02/27/2021    Benign skin lesion of multiple sites    Fever, unspecified 07/29/2022    Fever    Headache     Hyperlipidemia, unspecified 12/29/2022    Hyperlipidemia    Myalgia, unspecified site 07/29/2022    Myalgia    Nasal congestion 07/28/2022    Sinus congestion    Obesity 2023    Other seasonal allergic rhinitis     Seasonal allergies    Personal history of other diseases of the respiratory system 10/31/2019    " History of acute bronchitis    Personal history of other mental and behavioral disorders     History of depression    Sleep apnea 2017    Sneezing 04/05/2022    Sneezing   [2]   Past Surgical History:  Procedure Laterality Date    COLONOSCOPY      OTHER SURGICAL HISTORY  10/31/2019    No history of surgery   [3]   Family History  Problem Relation Name Age of Onset    Diabetes Mother      Depression Mother          major    COPD Father      Diabetes Father      Other (spinal stenosis) Father      Hyperlipidemia Mother Pretty Reynolds     Hypertension Mother Pretty Reynolds     Arthritis Mother Pretty Reynolds     Mental illness Mother Pretty Reynolds     Miscarriages / Stillbirths Mother Pretty Reynolds     Arthritis Father Favian Reynolds     Vision loss Father Favian Reynolds    [4]   Allergies  Allergen Reactions    Latex Unknown   [5] (Not in a hospital admission)  [6]   Current Outpatient Medications   Medication Sig Dispense Refill    azelastine (Astelin) 137 mcg (0.1 %) nasal spray Administer 1 spray into each nostril 2 times a day. Use in each nostril as directed 30 mL 12    fexofenadine (Allegra) 180 mg tablet Take 1 tablet (180 mg) by mouth once daily.      fluticasone (Flonase) 50 mcg/actuation nasal spray USE 2 SPRAYS IN EACH NOSTRIL ONCE DAILY 16 g 11    peg-sod sul-NaCl-KCl-asb-C (MoviPrep) 100-7.5-2.691 gram solution Schedule the initial dose the evening before colonoscopy, followed by a second dose the morning of procedure (2 day regimen) or ~ 90 minutes after starting dose (1 day regimen). 1 each 0    rosuvastatin (Crestor) 5 mg tablet TAKE 1 TABLET DAILY AT BEDTIME 90 tablet 3    triamcinolone (Nasacort) 55 mcg nasal inhaler Administer 2 sprays into each nostril once daily. 16.5 g 3    azelastine (Astelin) 137 mcg (0.1 %) nasal spray Administer 2 sprays into each nostril 2 times a day. Use in each nostril as directed 30 mL 3     No current facility-administered medications for this encounter.

## 2025-06-27 NOTE — ADDENDUM NOTE
Encounter addended by: Tiffanie Bar RN on: 6/27/2025 4:20 PM   Actions taken: Flowsheet accepted, Contacts section saved

## 2025-07-02 LAB
LABORATORY COMMENT REPORT: NORMAL
PATH REPORT.FINAL DX SPEC: NORMAL
PATH REPORT.GROSS SPEC: NORMAL
PATH REPORT.TOTAL CANCER: NORMAL

## 2025-07-10 ENCOUNTER — APPOINTMENT (OUTPATIENT)
Dept: PRIMARY CARE | Facility: CLINIC | Age: 50
End: 2025-07-10
Payer: OTHER GOVERNMENT

## 2025-07-18 DIAGNOSIS — J31.0 CHRONIC RHINITIS: Primary | ICD-10-CM

## 2025-07-21 RX ORDER — AZELASTINE 1 MG/ML
1 SPRAY, METERED NASAL 2 TIMES DAILY
Qty: 90 ML | Refills: 3 | Status: SHIPPED | OUTPATIENT
Start: 2025-07-21 | End: 2025-10-19

## 2025-08-18 ENCOUNTER — APPOINTMENT (OUTPATIENT)
Dept: OTOLARYNGOLOGY | Facility: CLINIC | Age: 50
End: 2025-08-18
Payer: OTHER GOVERNMENT

## 2025-08-28 ENCOUNTER — APPOINTMENT (OUTPATIENT)
Dept: OTOLARYNGOLOGY | Facility: CLINIC | Age: 50
End: 2025-08-28
Payer: OTHER GOVERNMENT

## 2025-08-28 DIAGNOSIS — J31.0 CHRONIC RHINITIS: ICD-10-CM

## 2025-08-28 PROCEDURE — 99213 OFFICE O/P EST LOW 20 MIN: CPT | Performed by: GENERAL PRACTICE

## 2025-08-28 PROCEDURE — 1036F TOBACCO NON-USER: CPT | Performed by: GENERAL PRACTICE

## 2025-08-28 RX ORDER — TRIAMCINOLONE ACETONIDE 55 UG/1
2 SPRAY, METERED NASAL DAILY
Qty: 16.5 G | Refills: 11 | Status: SHIPPED | OUTPATIENT
Start: 2025-08-28 | End: 2026-08-28